# Patient Record
Sex: FEMALE | Race: OTHER | Employment: FULL TIME | URBAN - METROPOLITAN AREA
[De-identification: names, ages, dates, MRNs, and addresses within clinical notes are randomized per-mention and may not be internally consistent; named-entity substitution may affect disease eponyms.]

---

## 2024-09-24 ENCOUNTER — TRANSCRIBE ORDERS (OUTPATIENT)
Dept: SCHEDULING | Age: 44
End: 2024-09-24

## 2024-09-24 DIAGNOSIS — M89.9 BONE LESION: Primary | ICD-10-CM

## 2024-10-21 ENCOUNTER — APPOINTMENT (OUTPATIENT)
Dept: CT IMAGING | Age: 44
End: 2024-10-21
Payer: COMMERCIAL

## 2024-10-21 ENCOUNTER — HOSPITAL ENCOUNTER (EMERGENCY)
Age: 44
Discharge: HOME | End: 2024-10-21
Attending: EMERGENCY MEDICINE
Payer: COMMERCIAL

## 2024-10-21 ENCOUNTER — APPOINTMENT (OUTPATIENT)
Dept: GENERAL RADIOLOGY | Age: 44
End: 2024-10-21
Payer: COMMERCIAL

## 2024-10-21 ENCOUNTER — APPOINTMENT (OUTPATIENT)
Dept: ULTRASOUND IMAGING | Age: 44
End: 2024-10-21
Payer: COMMERCIAL

## 2024-10-21 VITALS
WEIGHT: 196 LBS | HEIGHT: 60 IN | OXYGEN SATURATION: 97 % | RESPIRATION RATE: 18 BRPM | BODY MASS INDEX: 38.48 KG/M2 | DIASTOLIC BLOOD PRESSURE: 82 MMHG | TEMPERATURE: 98.3 F | HEART RATE: 97 BPM | SYSTOLIC BLOOD PRESSURE: 133 MMHG

## 2024-10-21 DIAGNOSIS — I26.93 SINGLE SUBSEGMENTAL PULMONARY EMBOLISM WITHOUT ACUTE COR PULMONALE (HCC): Primary | ICD-10-CM

## 2024-10-21 DIAGNOSIS — M79.661 PAIN IN BOTH LOWER LEGS: ICD-10-CM

## 2024-10-21 DIAGNOSIS — M79.662 PAIN IN BOTH LOWER LEGS: ICD-10-CM

## 2024-10-21 DIAGNOSIS — R91.1 INCIDENTAL PULMONARY NODULE: ICD-10-CM

## 2024-10-21 LAB
ALBUMIN SERPL-MCNC: 3.1 G/DL (ref 3.5–5)
ALBUMIN/GLOB SERPL: 0.6 (ref 1–1.9)
ALP SERPL-CCNC: 93 U/L (ref 35–104)
ALT SERPL-CCNC: 40 U/L (ref 8–45)
ANION GAP SERPL CALC-SCNC: 13 MMOL/L (ref 9–18)
AST SERPL-CCNC: 20 U/L (ref 15–37)
BASOPHILS # BLD: 0.1 K/UL (ref 0–0.2)
BASOPHILS NFR BLD: 1 % (ref 0–2)
BILIRUB SERPL-MCNC: 0.4 MG/DL (ref 0–1.2)
BUN SERPL-MCNC: 12 MG/DL (ref 6–23)
CALCIUM SERPL-MCNC: 10.1 MG/DL (ref 8.8–10.2)
CHLORIDE SERPL-SCNC: 98 MMOL/L (ref 98–107)
CK SERPL-CCNC: 24 U/L (ref 21–215)
CO2 SERPL-SCNC: 28 MMOL/L (ref 20–28)
CREAT SERPL-MCNC: 0.69 MG/DL (ref 0.6–1.1)
CRP SERPL HS-MCNC: 145 MG/L (ref 0–3)
DIFFERENTIAL METHOD BLD: ABNORMAL
EKG ATRIAL RATE: 118 BPM
EKG DIAGNOSIS: NORMAL
EKG P AXIS: 65 DEGREES
EKG P-R INTERVAL: 118 MS
EKG Q-T INTERVAL: 304 MS
EKG QRS DURATION: 68 MS
EKG QTC CALCULATION (BAZETT): 426 MS
EKG R AXIS: 72 DEGREES
EKG T AXIS: 61 DEGREES
EKG VENTRICULAR RATE: 118 BPM
EOSINOPHIL # BLD: 0.1 K/UL (ref 0–0.8)
EOSINOPHIL NFR BLD: 1 % (ref 0.5–7.8)
ERYTHROCYTE [DISTWIDTH] IN BLOOD BY AUTOMATED COUNT: 12.6 % (ref 11.9–14.6)
ERYTHROCYTE [SEDIMENTATION RATE] IN BLOOD: 70 MM/HR (ref 0–20)
GLOBULIN SER CALC-MCNC: 5.3 G/DL (ref 2.3–3.5)
GLUCOSE SERPL-MCNC: 139 MG/DL (ref 70–99)
HCT VFR BLD AUTO: 39.1 % (ref 35.8–46.3)
HGB BLD-MCNC: 12.6 G/DL (ref 11.7–15.4)
IMM GRANULOCYTES # BLD AUTO: 0.1 K/UL (ref 0–0.5)
IMM GRANULOCYTES NFR BLD AUTO: 1 % (ref 0–5)
LIPASE SERPL-CCNC: 21 U/L (ref 13–60)
LYMPHOCYTES # BLD: 1.3 K/UL (ref 0.5–4.6)
LYMPHOCYTES NFR BLD: 13 % (ref 13–44)
MAGNESIUM SERPL-MCNC: 1.9 MG/DL (ref 1.8–2.4)
MCH RBC QN AUTO: 27.5 PG (ref 26.1–32.9)
MCHC RBC AUTO-ENTMCNC: 32.2 G/DL (ref 31.4–35)
MCV RBC AUTO: 85.4 FL (ref 82–102)
MONOCYTES # BLD: 0.5 K/UL (ref 0.1–1.3)
MONOCYTES NFR BLD: 5 % (ref 4–12)
NEUTS SEG # BLD: 7.8 K/UL (ref 1.7–8.2)
NEUTS SEG NFR BLD: 79 % (ref 43–78)
NRBC # BLD: 0 K/UL (ref 0–0.2)
PLATELET # BLD AUTO: 365 K/UL (ref 150–450)
PMV BLD AUTO: 12.7 FL (ref 9.4–12.3)
POTASSIUM SERPL-SCNC: 3.9 MMOL/L (ref 3.5–5.1)
PROT SERPL-MCNC: 8.4 G/DL (ref 6.3–8.2)
RBC # BLD AUTO: 4.58 M/UL (ref 4.05–5.2)
SODIUM SERPL-SCNC: 138 MMOL/L (ref 136–145)
VIT B12 SERPL-MCNC: 615 PG/ML (ref 193–986)
WBC # BLD AUTO: 9.8 K/UL (ref 4.3–11.1)

## 2024-10-21 PROCEDURE — 99285 EMERGENCY DEPT VISIT HI MDM: CPT

## 2024-10-21 PROCEDURE — 86038 ANTINUCLEAR ANTIBODIES: CPT

## 2024-10-21 PROCEDURE — 6360000004 HC RX CONTRAST MEDICATION: Performed by: EMERGENCY MEDICINE

## 2024-10-21 PROCEDURE — 71260 CT THORAX DX C+: CPT | Performed by: RADIOLOGY

## 2024-10-21 PROCEDURE — 93970 EXTREMITY STUDY: CPT

## 2024-10-21 PROCEDURE — 36415 COLL VENOUS BLD VENIPUNCTURE: CPT

## 2024-10-21 PROCEDURE — 93970 EXTREMITY STUDY: CPT | Performed by: RADIOLOGY

## 2024-10-21 PROCEDURE — 83690 ASSAY OF LIPASE: CPT

## 2024-10-21 PROCEDURE — 93010 ELECTROCARDIOGRAM REPORT: CPT | Performed by: INTERNAL MEDICINE

## 2024-10-21 PROCEDURE — 85025 COMPLETE CBC W/AUTO DIFF WBC: CPT

## 2024-10-21 PROCEDURE — 71260 CT THORAX DX C+: CPT

## 2024-10-21 PROCEDURE — 80053 COMPREHEN METABOLIC PANEL: CPT

## 2024-10-21 PROCEDURE — 82607 VITAMIN B-12: CPT

## 2024-10-21 PROCEDURE — 86147 CARDIOLIPIN ANTIBODY EA IG: CPT

## 2024-10-21 PROCEDURE — 72100 X-RAY EXAM L-S SPINE 2/3 VWS: CPT

## 2024-10-21 PROCEDURE — 96374 THER/PROPH/DIAG INJ IV PUSH: CPT

## 2024-10-21 PROCEDURE — 83735 ASSAY OF MAGNESIUM: CPT

## 2024-10-21 PROCEDURE — 6360000002 HC RX W HCPCS: Performed by: EMERGENCY MEDICINE

## 2024-10-21 PROCEDURE — 83520 IMMUNOASSAY QUANT NOS NONAB: CPT

## 2024-10-21 PROCEDURE — 86141 C-REACTIVE PROTEIN HS: CPT

## 2024-10-21 PROCEDURE — 86148 ANTI-PHOSPHOLIPID ANTIBODY: CPT

## 2024-10-21 PROCEDURE — 85652 RBC SED RATE AUTOMATED: CPT

## 2024-10-21 PROCEDURE — 82550 ASSAY OF CK (CPK): CPT

## 2024-10-21 PROCEDURE — 93005 ELECTROCARDIOGRAM TRACING: CPT | Performed by: EMERGENCY MEDICINE

## 2024-10-21 RX ORDER — PREDNISONE 20 MG/1
20 TABLET ORAL 2 TIMES DAILY
Qty: 10 TABLET | Refills: 0 | Status: SHIPPED | OUTPATIENT
Start: 2024-10-21 | End: 2024-10-26

## 2024-10-21 RX ORDER — IOPAMIDOL 755 MG/ML
100 INJECTION, SOLUTION INTRAVASCULAR
Status: COMPLETED | OUTPATIENT
Start: 2024-10-21 | End: 2024-10-21

## 2024-10-21 RX ORDER — PREGABALIN 50 MG/1
50 CAPSULE ORAL 2 TIMES DAILY
Qty: 60 CAPSULE | Refills: 0 | Status: SHIPPED | OUTPATIENT
Start: 2024-10-21 | End: 2024-11-20

## 2024-10-21 RX ORDER — MORPHINE SULFATE 4 MG/ML
4 INJECTION INTRAVENOUS ONCE
Status: COMPLETED | OUTPATIENT
Start: 2024-10-21 | End: 2024-10-21

## 2024-10-21 RX ADMIN — IOPAMIDOL 75 ML: 755 INJECTION, SOLUTION INTRAVENOUS at 11:16

## 2024-10-21 RX ADMIN — MORPHINE SULFATE 4 MG: 4 INJECTION INTRAVENOUS at 12:00

## 2024-10-21 ASSESSMENT — PAIN DESCRIPTION - ORIENTATION
ORIENTATION: LEFT;RIGHT
ORIENTATION: RIGHT;LEFT

## 2024-10-21 ASSESSMENT — PAIN SCALES - GENERAL
PAINLEVEL_OUTOF10: 7
PAINLEVEL_OUTOF10: 10
PAINLEVEL_OUTOF10: 10

## 2024-10-21 ASSESSMENT — PAIN DESCRIPTION - LOCATION: LOCATION: LEG

## 2024-10-21 ASSESSMENT — PAIN - FUNCTIONAL ASSESSMENT: PAIN_FUNCTIONAL_ASSESSMENT: 0-10

## 2024-10-21 NOTE — ED TRIAGE NOTES
Patient arrives to ED pov from home. Patient reports SOB. Patient was diagnosed with a PE on 10/09/204. Patient was placed on Eliquis. Patient reports bilateral leg pain. Denies swelling or redness to legs. Patient reports her legs feel \"warm\".

## 2024-10-21 NOTE — DISCHARGE INSTRUCTIONS
1) your CT scan shows a an incidental finding of a pulmonary nodule.  It is important that this be imaged again in 3 months to ensure that it is not changing.    2) the CT scan of your chest shows that your pulmonary embolism is not getting worse and likely improving.  Continue to take your blood thinner as prescribed.    3) the exact cause of the discomfort in your lower extremities is unclear.  However, you do have some elevated inflammatory markers and we are going to start steroids and Lyrica to see if this helps with your pain.  In addition I have placed a referral for follow-up in our neurology clinic.  They should call you to schedule an appointment.    4) I have also ordered labs that will not come back today while in the emergency department.  These included an antiphospholipid antibody and an TALI.  It is very important that you have your primary care doctor follow-up on the results of these tests.    5) return to the emergency department for any significant worsening of your symptoms.

## 2024-10-21 NOTE — ED PROVIDER NOTES
Emergency Department Provider Note       PCP: Isa Du MD   Age: 44 y.o.   Sex: female     DISPOSITION Decision To Discharge 10/21/2024 12:26:58 PM  Condition at Disposition: Data Unavailable       ICD-10-CM    1. Single subsegmental pulmonary embolism without acute cor pulmonale (HCC)  I26.93       2. Pain in both lower legs  M79.661 pregabalin (LYRICA) 50 MG capsule    M79.662 [facility]      3. Incidental pulmonary nodule  R91.1           Medical Decision Making     Patient is noted to be tachycardic in triage.  Given her known history of pulmonary embolism, I will repeat a CT scan to ensure that this does not appear to be worsening.    Her discomfort seems less likely to be related to nerve root impingement, she has no findings concerning for cauda equina, presentation does not appear consistent with Guillain-Barré.  However I do have some question for acute small fiber neuropathy.  I will add on inflammatory markers.  In addition given the symptoms as well as her new diagnosis of pulmonary embolism, I do have concerns for possible autoimmune processes.  I will order an TALI, antiphospholipid antibody, B12, CK, with plans for these labs to be followed up as an outpatient by her primary care physician or by hematology.    12:32 PM EDT  Evaluation the emergency department is reassuring.  CT shows interval improvement of the pulmonary embolism.  Patient's heart rate is now 102, tachycardia has improved.  She appears more comfortable as well.  She has noted to have elevated inflammatory markers.  I do have ongoing concern for possible small fiber neuropathy.  I did discuss the case with neurology.  They recommended starting Lyrica 50 mg twice daily.  I will also start a short course of prednisone to see if this helps with improvement.  In addition I will refer to the [facility] neurology clinic to determine if further evaluation and testing is necessary.    In addition, I have

## 2024-10-21 NOTE — ED NOTES
US called to confirm they have pt.  US confirmed they have pt and will send to  39 when done.     Gretta Harkins, ALLAN  10/21/24 [id number]

## 2024-10-22 LAB
ANA SER QL: NEGATIVE
INTERPRETATION: NORMAL
SPECIMEN STATUS: NORMAL

## 2024-10-29 ENCOUNTER — OFFICE VISIT (OUTPATIENT)
Dept: NEUROLOGY | Age: 44
End: 2024-10-29
Payer: COMMERCIAL

## 2024-10-29 VITALS
HEIGHT: 60 IN | OXYGEN SATURATION: 98 % | SYSTOLIC BLOOD PRESSURE: 123 MMHG | WEIGHT: 196.4 LBS | DIASTOLIC BLOOD PRESSURE: 85 MMHG | BODY MASS INDEX: 38.56 KG/M2 | HEART RATE: 85 BPM

## 2024-10-29 DIAGNOSIS — R53.83 OTHER FATIGUE: ICD-10-CM

## 2024-10-29 DIAGNOSIS — G44.89 OTHER HEADACHE SYNDROME: ICD-10-CM

## 2024-10-29 DIAGNOSIS — M79.10 MYALGIA: Primary | ICD-10-CM

## 2024-10-29 DIAGNOSIS — M79.10 MYALGIA: ICD-10-CM

## 2024-10-29 PROCEDURE — 99204 OFFICE O/P NEW MOD 45 MIN: CPT | Performed by: PHYSICAL THERAPIST

## 2024-10-29 RX ORDER — APIXABAN 5 MG/1
TABLET, FILM COATED ORAL
COMMUNITY
Start: 2024-10-10

## 2024-10-29 RX ORDER — PREDNISONE 10 MG/1
TABLET ORAL
Qty: 70 TABLET | Refills: 0 | Status: SHIPPED | OUTPATIENT
Start: 2024-10-29 | End: 2024-12-24

## 2024-10-29 ASSESSMENT — PATIENT HEALTH QUESTIONNAIRE - PHQ9
1. LITTLE INTEREST OR PLEASURE IN DOING THINGS: NOT AT ALL
SUM OF ALL RESPONSES TO PHQ QUESTIONS 1-9: 0
SUM OF ALL RESPONSES TO PHQ QUESTIONS 1-9: 0
2. FEELING DOWN, DEPRESSED OR HOPELESS: NOT AT ALL
SUM OF ALL RESPONSES TO PHQ9 QUESTIONS 1 & 2: 0
SUM OF ALL RESPONSES TO PHQ QUESTIONS 1-9: 0
SUM OF ALL RESPONSES TO PHQ QUESTIONS 1-9: 0

## 2024-10-29 NOTE — PROGRESS NOTES
No significant stenosis.  No aneurysm.  No evidence of acute abnormality.     Most recent MRI - I was unable to personally review these images, but did review the results as listed.    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  Likely cystic.  Otherwise, unremarkable MRI    Anu was seen today for follow-up.    Diagnoses and all orders for this visit:    Myalgia  -It is somewhat atypical, but could be consistent with polymyalgia rheumatica given the elevations in ESR and CRP.  While there is a significant amount of subjective pain with mobility, this is limited as she is recently had her steroids which stopped most of her pain.  I do not have any indication that there is a neuropathy present at this time, but this may also be being managed by the steroid use.  Low suspicion for any myopathy, CK was normal.  I do believe this is a primary rheumatologic disorder, and she has a rheumatology appointment on 5 November.  In the meantime we will continue to rule out neurologic diseases as well as some overlap between her rheumatologic and neurologic causes.  Will test for Sjogren's antibodies as well as rheumatoid factor.  It is noted TALI was normal.  -I have reinitiated her prednisone taper, which is a relatively short taper considering she has appoint with rheumatologist on 5 November.  Initiate 20 mg daily for 14 days, 15 mg 14 days, 10 mg for 14 days, and then 5 mg 14 days per  -     Rheumatoid Factor; Future  -     MRI LUMBAR SPINE W WO CONTRAST; Future  -     Sjogren's Ab (SS-A, SS-B); Future    Other fatigue  -As above  -     Rheumatoid Factor; Future  -     Sjogren's Ab (SS-A, SS-B); Future    Other headache syndrome  -I am certainly concerned that with her temple tenderness, and jaw pain she is noting that this could be indicative of giant cell arteritis.  Cannot test ESR again as she has recently had steroids and this will likely impact this test.  I would probably proceed with a temporal

## 2024-10-30 LAB
ENA SS-A AB SER-ACNC: <0.2 AI (ref 0–0.9)
ENA SS-B AB SER-ACNC: <0.2 AI (ref 0–0.9)
RHEUMATOID FACT SER QL LA: NEGATIVE

## 2024-11-05 LAB
INTERPRETATION: NORMAL
SPECIMEN STATUS: NORMAL

## 2024-11-05 RX ORDER — METHYLPREDNISOLONE 4 MG/1
TABLET ORAL SEE ADMIN INSTRUCTIONS
COMMUNITY
Start: 2024-09-23 | End: 2024-11-06

## 2024-11-05 RX ORDER — LORATADINE 10 MG/1
10 TABLET ORAL DAILY
COMMUNITY
End: 2024-11-06

## 2024-11-05 RX ORDER — METHOCARBAMOL 500 MG/1
500 TABLET, FILM COATED ORAL 2 TIMES DAILY
COMMUNITY
Start: 2024-09-28 | End: 2024-11-06

## 2024-11-05 RX ORDER — MELOXICAM 7.5 MG/1
TABLET ORAL
COMMUNITY
Start: 2024-08-26 | End: 2024-11-06

## 2024-11-05 RX ORDER — IBUPROFEN 600 MG/1
600 TABLET, FILM COATED ORAL
COMMUNITY
Start: 2024-09-23 | End: 2024-11-06

## 2024-11-05 RX ORDER — FAMOTIDINE 20 MG/1
20 TABLET, FILM COATED ORAL
COMMUNITY
Start: 2024-09-23 | End: 2024-11-06

## 2024-11-05 RX ORDER — TRAMADOL HYDROCHLORIDE 50 MG/1
50 TABLET ORAL NIGHTLY PRN
COMMUNITY
Start: 2024-10-14 | End: 2024-11-06

## 2024-11-05 RX ORDER — HYDROCODONE BITARTRATE AND ACETAMINOPHEN 5; 325 MG/1; MG/1
1 TABLET ORAL
COMMUNITY
Start: 2024-09-23 | End: 2024-11-06

## 2024-11-05 RX ORDER — FLUTICASONE PROPIONATE 50 MCG
1 SPRAY, SUSPENSION (ML) NASAL DAILY
COMMUNITY
Start: 2024-09-23

## 2024-11-05 ASSESSMENT — RHEUMATOLOGY NEW PATIENT QUESTIONNAIRE
HEARTBURN OR REFLUX: N
UNUSUAL BLEEDING: N
ANXIETY: N
HOW WOULD YOU DESCRIBE YOUR STIFFNESS ON AVERAGE: MILD
MUSCLE WEAKNESS: Y
JAUNDICE: N
DIFFICULTY BREATHING LYING DOWN: Y
ANEMIA: N
DIFFICULTY FALLING ASLEEP: N
HEADACHES: Y
UNEXPLAINED HEARING LOSS: N
PERSISTENT DIARRHEA: Y
SORES IN MOUTH OR NOSE: N
RASH OR ULCERS: N
DRYNESS OF MOUTH: N
JOINT PAIN: Y
VOMITING OF BLOOD OR COFFEE GROUND CONSISTENCY MATERIAL: N
UNEXPLAINED WEIGHT CHANGE: Y
PAIN OR BURNING ON URINATION: N
EYE PAIN: Y
SWOLLEN OR TENDER GLANDS: N
AGITATION: N
COLOR CHANGES OF HANDS OR FEET IN THE COLD: N
HOARSE VOICE: N
CHEST PAIN: N
NODULES/BUMPS: N
NIGHT SWEATS: Y
SEIZURES: N
UNUSUALLY RAPID OR SLOWED HEART RATE: Y
DIFFICULTY STAYING ASLEEP: Y
BEHAVIORAL CHANGES: N
ABNORMAL URINE: N
LOSS OF CONSCIOUSNESS: N
FEVER: N
UNUSUAL FATIGUE: Y
DEPRESSION: N
SHORTNESS OF BREATH: Y
BLACK STOOLS: N
BLOOD IN STOOLS: N
LIST JOINTS AFFECTED BY SWELLING IN THE PAST MONTH: KNEES
STOMACH PAIN: N
MEMORY LOSS: N
FAINTING: N
VAGINAL DRYNESS: N
NUMBNESS OR TINGLING IN HANDS OR FEET: Y
EASILY LOSING TEMPER: N
SUN SENSITIVE (SUN ALLERGY): N
EASY BRUISING: Y
COUGH: N
MORNING STIFFNESS: Y
SWOLLEN LEGS OR FEET: Y
RASH: N
MORNING STIFFNESS IN LOWER BACK: N
EXCESSIVE HAIR LOSS (MORE THAN YOUR NORM): N
INCREASED SUSCEPTIBILITY TO INFECTION: N
SKIN REDNESS: N
SKIN TIGHTNESS: N
DIFFICULTY SWALLOWING: N
JOINT SWELLING: Y
NAUSEA: N

## 2024-11-06 ENCOUNTER — OFFICE VISIT (OUTPATIENT)
Dept: RHEUMATOLOGY | Age: 44
End: 2024-11-06

## 2024-11-06 VITALS
DIASTOLIC BLOOD PRESSURE: 72 MMHG | HEART RATE: 82 BPM | WEIGHT: 194 LBS | SYSTOLIC BLOOD PRESSURE: 123 MMHG | HEIGHT: 60 IN | BODY MASS INDEX: 38.09 KG/M2

## 2024-11-06 DIAGNOSIS — R70.0 ESR RAISED: ICD-10-CM

## 2024-11-06 DIAGNOSIS — E03.8 OTHER SPECIFIED HYPOTHYROIDISM: ICD-10-CM

## 2024-11-06 DIAGNOSIS — R53.83 OTHER FATIGUE: ICD-10-CM

## 2024-11-06 DIAGNOSIS — M25.40 JOINT SWELLING: ICD-10-CM

## 2024-11-06 DIAGNOSIS — M06.4 INFLAMMATORY POLYARTHRITIS (HCC): ICD-10-CM

## 2024-11-06 DIAGNOSIS — R79.82 CRP ELEVATED: ICD-10-CM

## 2024-11-06 DIAGNOSIS — M79.10 MYALGIA: ICD-10-CM

## 2024-11-06 DIAGNOSIS — R89.4 SEROLOGIC ABNORMALITY: ICD-10-CM

## 2024-11-06 DIAGNOSIS — I82.409 ACUTE DEEP VEIN THROMBOSIS (DVT) OF OTHER VEIN OF LOWER EXTREMITY, UNSPECIFIED LATERALITY (HCC): ICD-10-CM

## 2024-11-06 DIAGNOSIS — Z71.85 VACCINE COUNSELING: ICD-10-CM

## 2024-11-06 DIAGNOSIS — Z79.52 LONG TERM (CURRENT) USE OF SYSTEMIC STEROIDS: ICD-10-CM

## 2024-11-06 DIAGNOSIS — M06.4 INFLAMMATORY POLYARTHRITIS (HCC): Primary | ICD-10-CM

## 2024-11-06 DIAGNOSIS — Z79.899 LONG TERM CURRENT USE OF IMMUNOSUPPRESSIVE DRUG: ICD-10-CM

## 2024-11-06 DIAGNOSIS — G62.9 NEUROPATHY: ICD-10-CM

## 2024-11-06 DIAGNOSIS — E55.9 HYPOVITAMINOSIS D: ICD-10-CM

## 2024-11-06 LAB
25(OH)D3 SERPL-MCNC: 22.9 NG/ML (ref 30–100)
CK SERPL-CCNC: 23 U/L (ref 21–215)
CRP SERPL-MCNC: 1 MG/DL (ref 0–0.4)
ERYTHROCYTE [SEDIMENTATION RATE] IN BLOOD: 23 MM/HR (ref 0–20)
HAV IGM SER QL: NONREACTIVE
HBV CORE IGM SER QL: NONREACTIVE
HBV SURFACE AG SER QL: NONREACTIVE
HCV AB SER QL: NONREACTIVE

## 2024-11-06 RX ORDER — PREGABALIN 50 MG/1
50 CAPSULE ORAL 2 TIMES DAILY
COMMUNITY

## 2024-11-06 RX ORDER — PREDNISONE 10 MG/1
15 TABLET ORAL DAILY
COMMUNITY

## 2024-11-06 NOTE — PROGRESS NOTES
Patient is seen as a new consult at the request of Dr. Fontaine          Subjective:      HPI:        Permanent history    Mrs. Thompson is a very pleasant 44-year-old  lady with past medical history of DVT on long-term anticoagulation who presents for evaluation of possible inflammatory polyarthritis.    Patient reports - 6mths ago she was fine, end of Aug 2024, hurt knee, turned wrong way, heard pop  ,took advil ice, went to Urgent Care, took xrays, no fx, given mobic, sent to Ortho, given steroid shot, helped for 4 days, went back to Ortho, had Mri, results were good for knee but symptoms persisted then had MRI femur, went to PCP for new onset/worsening headaches and knee pain.  She reports headache started after she was given tramadol one of the ER visits.  After this over the next 2 months she went to ER 3 more times, numbness on lt side of scalp,pcp gave her a tylenol cocktail -the headaches as mentioned got worse after taking tramadol but even after she stopped the tramadol headaches contined.  Again leg pain continued, went to the ER again for slowly leg pain worsened to involve both legs and whole legs.  Almost looked like she was trying to l earn to walk, so then she seen another time at [place] ER where she was, given prednisone and lyrica anf d that was the 1st night she was able to sleep, next morning she could walk again. Currently taking Pred 10mg bid.  said she had PMR and GCA. The headaches have subsided, head still feels weird, throbbing over temples.  During all of this patient was noted to have DVT, for which she was started on anticoagulation and recently followed up outpatient with hematology.  Has also been seen by neurology both ER and outpatient.  No definitive diagnosis.    Has clearly had dramatic response to prednisone and every time she tapers down symptoms slowly start worsening again.    Denies any family history of rheumatoid arthritis, lupus, Sjogren's, has 2 children

## 2024-11-06 NOTE — PATIENT INSTRUCTIONS
Labs - CCP, hep panel, vitamin D, CRP, sed rate, lupus panel, ANCA's, C3, C4, anti-GBM antibodies, CK, anti-smooth muscle antibody, antimitochondrial antibody, TB QuantiFERON  Continue prednisone 20 mg daily for now  Continue Lyrica 50 mg twice daily  Return to clinic in a week call if any issues  Records including CBC and CMP from Dr. Du's office from a year ago    FR- 1WK,Labs BS NOW,PCP Notes

## 2024-11-07 LAB
C-ANCA TITR SER IF: NORMAL TITER
CCP IGA+IGG SERPL IA-ACNC: 6 UNITS (ref 0–19)
CENTROMERE B AB SER-ACNC: <0.2 AI (ref 0–0.9)
CHROMATIN AB SERPL-ACNC: <0.2 AI (ref 0–0.9)
DSDNA AB SER-ACNC: <1 IU/ML (ref 0–9)
ENA JO1 AB SER-ACNC: <0.2 AI (ref 0–0.9)
ENA RNP AB SER-ACNC: <0.2 AI (ref 0–0.9)
ENA SCL70 AB SER-ACNC: <0.2 AI (ref 0–0.9)
ENA SM AB SER-ACNC: <0.2 AI (ref 0–0.9)
ENA SS-A AB SER-ACNC: <0.2 AI (ref 0–0.9)
ENA SS-B AB SER-ACNC: <0.2 AI (ref 0–0.9)
Lab: NORMAL
MYELOPEROXIDASE AB SER IA-ACNC: <0.2 UNITS (ref 0–0.9)
P-ANCA ATYPICAL TITR SER IF: NORMAL TITER
P-ANCA TITR SER IF: NORMAL TITER
PROTEINASE3 AB SER IA-ACNC: <0.2 UNITS (ref 0–0.9)
RHEUMATOID FACT SER QL LA: NEGATIVE

## 2024-11-08 LAB
C3 SERPL-MCNC: 145 MG/DL (ref 82–167)
C4 SERPL-MCNC: 32 MG/DL (ref 12–38)
GBM IGG SER-ACNC: <0.2 UNITS (ref 0–0.9)
MITOCHONDRIA M2 IGG SER-ACNC: <20 UNITS (ref 0–20)
SMA IGG SER-ACNC: 11 UNITS (ref 0–19)

## 2024-11-11 LAB
M TB IFN-G BLD-IMP: ABNORMAL
M TB IFN-G CD4+ T-CELLS BLD-ACNC: 0.1 IU/ML
M TBIFN-G CD4+ CD8+T-CELLS BLD-ACNC: 0.52 IU/ML
QUANTIFERON CRITERIA: ABNORMAL
QUANTIFERON MITOGEN VALUE: 3.96 IU/ML
QUANTIFERON NIL VALUE: 9.82 IU/ML

## 2024-11-13 ENCOUNTER — OFFICE VISIT (OUTPATIENT)
Dept: RHEUMATOLOGY | Age: 44
End: 2024-11-13

## 2024-11-13 VITALS
SYSTOLIC BLOOD PRESSURE: 128 MMHG | BODY MASS INDEX: 38.28 KG/M2 | HEART RATE: 80 BPM | DIASTOLIC BLOOD PRESSURE: 85 MMHG | WEIGHT: 196 LBS

## 2024-11-13 DIAGNOSIS — Z23 NEED FOR INFLUENZA VACCINATION: ICD-10-CM

## 2024-11-13 DIAGNOSIS — M79.10 MYALGIA: ICD-10-CM

## 2024-11-13 DIAGNOSIS — Z71.85 VACCINE COUNSELING: ICD-10-CM

## 2024-11-13 DIAGNOSIS — E03.8 OTHER SPECIFIED HYPOTHYROIDISM: ICD-10-CM

## 2024-11-13 DIAGNOSIS — M06.4 INFLAMMATORY POLYARTHRITIS (HCC): ICD-10-CM

## 2024-11-13 DIAGNOSIS — R79.82 CRP ELEVATED: ICD-10-CM

## 2024-11-13 DIAGNOSIS — R70.0 ESR RAISED: ICD-10-CM

## 2024-11-13 DIAGNOSIS — Z23 ENCOUNTER FOR IMMUNIZATION: ICD-10-CM

## 2024-11-13 DIAGNOSIS — R53.83 OTHER FATIGUE: ICD-10-CM

## 2024-11-13 DIAGNOSIS — R89.4 SEROLOGIC ABNORMALITY: ICD-10-CM

## 2024-11-13 DIAGNOSIS — M06.4 INFLAMMATORY POLYARTHRITIS (HCC): Primary | ICD-10-CM

## 2024-11-13 NOTE — PATIENT INSTRUCTIONS
Labs -at next visit repeat QuantiFERON, APLS antibody panel, parvovirus  Continue prednisone but decrease to 15 mg x 5 days then 10 mg x 5 days then 5 mg daily until follow-up mg daily for now  Records including CBC and CMP from Dr. Du's office from a year ago  Reading material on inflammatory polyarthritis on methotrexate  High-dose flu shot today  Return to clinic in 2 weeks call if any issues    2WK,Labs BS Now,PCP Notes

## 2024-11-14 LAB
APTT SCREEN TO CONFIRM RATIO: 1.08 RATIO (ref 0–1.34)
CONFIRM APTT/NORMAL: 41.4 SEC (ref 0–47.6)
DRVVT RATIO: 1 RATIO (ref 0.8–1.2)
LA 2 SCREEN W REFLEX-IMP: ABNORMAL
MIXING DRVVT: 41.2 SEC (ref 0–40.4)
SCREEN APTT: 43.2 SEC (ref 0–43.5)
SCREEN DRVVT: 48.1 SEC (ref 0–47)
THROMBIN TIME: 18.5 SEC (ref 0–23)

## 2024-11-15 LAB
B19V IGG SER IA-ACNC: 0.2 INDEX (ref 0–0.8)
B19V IGM SER IA-ACNC: 0.1 INDEX (ref 0–0.8)
B2 GLYCOPROT1 IGG SER-ACNC: <9 GPI IGG UNITS (ref 0–20)
B2 GLYCOPROT1 IGM SER-ACNC: 13 GPI IGM UNITS (ref 0–32)

## 2024-11-18 LAB
M TB IFN-G BLD-IMP: NEGATIVE
M TB IFN-G CD4+ T-CELLS BLD-ACNC: 0.02 IU/ML
M TBIFN-G CD4+ CD8+T-CELLS BLD-ACNC: 0.1 IU/ML
QUANTIFERON CRITERIA: NORMAL
QUANTIFERON MITOGEN VALUE: 4.76 IU/ML
QUANTIFERON NIL VALUE: 0.01 IU/ML

## 2024-11-24 RX ORDER — PREDNISONE 5 MG/1
TABLET ORAL
Qty: 100 TABLET | Refills: 0 | Status: SHIPPED | OUTPATIENT
Start: 2024-11-24

## 2024-11-26 NOTE — PROGRESS NOTES
Subjective:      HPI:        Permanent history    Mrs. Thompson is a very pleasant 44-year-old  lady with past medical history of DVT on long-term anticoagulation who presents for evaluation of possible inflammatory polyarthritis.    Patient reports - 6mths ago she was fine, end of Aug 2024, hurt knee, turned wrong way, heard pop  ,took advil ice, went to Urgent Care, took xrays, no fx, given mobic, sent to Ortho, given steroid shot, helped for 4 days, went back to Ortho, had Mri, results were good for knee but symptoms persisted then had MRI femur, went to PCP for new onset/worsening headaches and knee pain.  She reports headache started after she was given tramadol one of the ER visits.  After this over the next 2 months she went to ER 3 more times, numbness on lt side of scalp,pcp gave her a tylenol cocktail -the headaches as mentioned got worse after taking tramadol but even after she stopped the tramadol headaches contined.  Again leg pain continued, went to the ER again for slowly leg pain worsened to involve both legs and whole legs.  Almost looked like she was trying to l earn to walk, so then she seen another time at [place] ER where she was, given prednisone and lyrica anf d that was the 1st night she was able to sleep, next morning she could walk again. Currently taking Pred 10mg bid.  said she had PMR and GCA. The headaches have subsided, head still feels weird, throbbing over temples.  During all of this patient was noted to have DVT, for which she was started on anticoagulation and recently followed up outpatient with hematology.  Has also been seen by neurology both ER and outpatient.  No definitive diagnosis.    Has clearly had dramatic response to prednisone and every time she tapers down symptoms slowly start worsening again.    Denies any family history of rheumatoid arthritis, lupus, Sjogren's, has 2 children who are otherwise healthy no significant history is either in herself

## 2024-11-27 ENCOUNTER — OFFICE VISIT (OUTPATIENT)
Dept: RHEUMATOLOGY | Age: 44
End: 2024-11-27
Payer: COMMERCIAL

## 2024-11-27 VITALS
HEART RATE: 86 BPM | BODY MASS INDEX: 39.27 KG/M2 | DIASTOLIC BLOOD PRESSURE: 86 MMHG | HEIGHT: 60 IN | SYSTOLIC BLOOD PRESSURE: 135 MMHG | WEIGHT: 200 LBS

## 2024-11-27 DIAGNOSIS — I77.6 VASCULITIS (HCC): ICD-10-CM

## 2024-11-27 DIAGNOSIS — Z79.899 HIGH RISK MEDICATION USE: ICD-10-CM

## 2024-11-27 DIAGNOSIS — E55.9 HYPOVITAMINOSIS D: ICD-10-CM

## 2024-11-27 DIAGNOSIS — Z79.52 LONG TERM (CURRENT) USE OF SYSTEMIC STEROIDS: ICD-10-CM

## 2024-11-27 DIAGNOSIS — R89.4 SEROLOGIC ABNORMALITY: ICD-10-CM

## 2024-11-27 DIAGNOSIS — Z79.899 LONG TERM CURRENT USE OF IMMUNOSUPPRESSIVE DRUG: ICD-10-CM

## 2024-11-27 DIAGNOSIS — M25.40 JOINT SWELLING: ICD-10-CM

## 2024-11-27 DIAGNOSIS — I82.409 ACUTE DEEP VEIN THROMBOSIS (DVT) OF OTHER VEIN OF LOWER EXTREMITY, UNSPECIFIED LATERALITY (HCC): ICD-10-CM

## 2024-11-27 DIAGNOSIS — R53.83 OTHER FATIGUE: ICD-10-CM

## 2024-11-27 DIAGNOSIS — M06.4 INFLAMMATORY POLYARTHRITIS (HCC): Primary | ICD-10-CM

## 2024-11-27 DIAGNOSIS — R70.0 ESR RAISED: ICD-10-CM

## 2024-11-27 DIAGNOSIS — G62.9 NEUROPATHY: ICD-10-CM

## 2024-11-27 DIAGNOSIS — M79.10 MYALGIA: ICD-10-CM

## 2024-11-27 LAB
APPEARANCE UR: CLEAR
BACTERIA URNS QL MICRO: ABNORMAL /HPF
BILIRUB UR QL: NEGATIVE
CASTS URNS QL MICRO: 0 /LPF
COLOR UR: ABNORMAL
CRYSTALS URNS QL MICRO: 0 /LPF
EPI CELLS #/AREA URNS HPF: ABNORMAL /HPF
ERYTHROCYTE [SEDIMENTATION RATE] IN BLOOD: 16 MM/HR (ref 0–20)
GLUCOSE UR STRIP.AUTO-MCNC: NEGATIVE MG/DL
HGB UR QL STRIP: NEGATIVE
KETONES UR QL STRIP.AUTO: NEGATIVE MG/DL
LEUKOCYTE ESTERASE UR QL STRIP.AUTO: ABNORMAL
MUCOUS THREADS URNS QL MICRO: 0 /LPF
NITRITE UR QL STRIP.AUTO: NEGATIVE
OTHER OBSERVATIONS: ABNORMAL
PH UR STRIP: 5 (ref 5–9)
PROT UR STRIP-MCNC: NEGATIVE MG/DL
RBC #/AREA URNS HPF: ABNORMAL /HPF
SP GR UR REFRACTOMETRY: 1.02 (ref 1–1.02)
URINE CULTURE IF INDICATED: ABNORMAL
UROBILINOGEN UR QL STRIP.AUTO: 0.2 EU/DL (ref 0.2–1)
WBC URNS QL MICRO: ABNORMAL /HPF

## 2024-11-27 PROCEDURE — 99215 OFFICE O/P EST HI 40 MIN: CPT | Performed by: INTERNAL MEDICINE

## 2024-11-27 NOTE — PATIENT INSTRUCTIONS
Labs -today urinalysis, urine culture if needed, ESR, sed rate, ANCA's, anti-GBM antibodies   Increase prednisone to 20 mg daily for 5 days then 15 mg x 5 days then 10 mg x 5 days then 5 mg daily until follow-up mg daily for now  Start methotrexate 4 pills which is 10 mg altogether once weekly with daily folic acid 1 mg  Return to clinic in 4 weeks with CBC and CMP call if any issues    4WK,Labs BS NOW & PRIOR

## 2024-11-29 RX ORDER — METHOTREXATE 2.5 MG/1
TABLET ORAL
Qty: 48 TABLET | Refills: 0 | OUTPATIENT
Start: 2024-11-29

## 2024-11-29 RX ORDER — FOLIC ACID 1 MG/1
1 TABLET ORAL DAILY
Qty: 90 TABLET | Refills: 0 | OUTPATIENT
Start: 2024-11-29

## 2024-11-29 RX ORDER — PREDNISONE 5 MG/1
TABLET ORAL
Qty: 100 TABLET | Refills: 0 | OUTPATIENT
Start: 2024-11-29

## 2024-12-02 LAB
C-ANCA TITR SER IF: NORMAL TITER
GBM IGG SER-ACNC: <0.2 UNITS (ref 0–0.9)
MYELOPEROXIDASE AB SER IA-ACNC: <0.2 UNITS (ref 0–0.9)
P-ANCA ATYPICAL TITR SER IF: NORMAL TITER
P-ANCA TITR SER IF: NORMAL TITER
PROTEINASE3 AB SER IA-ACNC: <0.2 UNITS (ref 0–0.9)

## 2024-12-17 NOTE — PROGRESS NOTES
Subjective:      HPI:        Permanent history    Mrs. Thompson is a very pleasant 44-year-old  lady with past medical history of DVT on long-term anticoagulation who presents for evaluation of possible inflammatory polyarthritis.    Patient reports - 6mths ago she was fine, end of Aug 2024, hurt knee, turned wrong way, heard pop  ,took advil ice, went to Urgent Care, took xrays, no fx, given mobic, sent to Ortho, given steroid shot, helped for 4 days, went back to Ortho, had Mri, results were good for knee but symptoms persisted then had MRI femur, went to PCP for new onset/worsening headaches and knee pain.  She reports headache started after she was given tramadol one of the ER visits.  After this over the next 2 months she went to ER 3 more times, numbness on lt side of scalp,pcp gave her a tylenol cocktail -the headaches as mentioned got worse after taking tramadol but even after she stopped the tramadol headaches contined.  Again leg pain continued, went to the ER again for slowly leg pain worsened to involve both legs and whole legs.  Almost looked like she was trying to l earn to walk, so then she seen another time at [place] ER where she was, given prednisone and lyrica anf d that was the 1st night she was able to sleep, next morning she could walk again. Currently taking Pred 10mg bid.  said she had PMR and GCA. The headaches have subsided, head still feels weird, throbbing over temples.  During all of this patient was noted to have DVT, for which she was started on anticoagulation and recently followed up outpatient with hematology.  Has also been seen by neurology both ER and outpatient.  No definitive diagnosis.    Has clearly had dramatic response to prednisone and every time she tapers down symptoms slowly start worsening again.    Denies any family history of rheumatoid arthritis, lupus, Sjogren's, has 2 children who are otherwise healthy no significant history is either in herself or

## 2024-12-18 ENCOUNTER — OFFICE VISIT (OUTPATIENT)
Dept: RHEUMATOLOGY | Age: 44
End: 2024-12-18
Payer: COMMERCIAL

## 2024-12-18 VITALS
DIASTOLIC BLOOD PRESSURE: 80 MMHG | SYSTOLIC BLOOD PRESSURE: 123 MMHG | BODY MASS INDEX: 40.64 KG/M2 | WEIGHT: 207 LBS | HEART RATE: 83 BPM | HEIGHT: 60 IN

## 2024-12-18 DIAGNOSIS — B37.31 VAGINAL CANDIDIASIS: ICD-10-CM

## 2024-12-18 DIAGNOSIS — R89.4 SEROLOGIC ABNORMALITY: ICD-10-CM

## 2024-12-18 DIAGNOSIS — G62.9 NEUROPATHY: ICD-10-CM

## 2024-12-18 DIAGNOSIS — M25.40 JOINT SWELLING: ICD-10-CM

## 2024-12-18 DIAGNOSIS — M06.4 INFLAMMATORY POLYARTHRITIS (HCC): Primary | ICD-10-CM

## 2024-12-18 DIAGNOSIS — Z79.52 LONG TERM (CURRENT) USE OF SYSTEMIC STEROIDS: ICD-10-CM

## 2024-12-18 DIAGNOSIS — M79.10 MYALGIA: ICD-10-CM

## 2024-12-18 DIAGNOSIS — Z79.899 HIGH RISK MEDICATION USE: ICD-10-CM

## 2024-12-18 DIAGNOSIS — Z79.899 LONG TERM CURRENT USE OF IMMUNOSUPPRESSIVE DRUG: ICD-10-CM

## 2024-12-18 DIAGNOSIS — R53.83 OTHER FATIGUE: ICD-10-CM

## 2024-12-18 DIAGNOSIS — E55.9 HYPOVITAMINOSIS D: ICD-10-CM

## 2024-12-18 DIAGNOSIS — I82.409 ACUTE DEEP VEIN THROMBOSIS (DVT) OF OTHER VEIN OF LOWER EXTREMITY, UNSPECIFIED LATERALITY (HCC): ICD-10-CM

## 2024-12-18 PROCEDURE — 99215 OFFICE O/P EST HI 40 MIN: CPT | Performed by: INTERNAL MEDICINE

## 2024-12-18 RX ORDER — ERGOCALCIFEROL 1.25 MG/1
50000 CAPSULE, LIQUID FILLED ORAL WEEKLY
Qty: 12 CAPSULE | Refills: 0 | Status: SHIPPED | OUTPATIENT
Start: 2024-12-18

## 2024-12-18 RX ORDER — GABAPENTIN 300 MG/1
CAPSULE ORAL
Qty: 90 CAPSULE | Refills: 0 | Status: SHIPPED | OUTPATIENT
Start: 2024-12-18 | End: 2025-12-23

## 2024-12-18 RX ORDER — FOLIC ACID 1 MG/1
1 TABLET ORAL DAILY
Qty: 90 TABLET | Refills: 0 | Status: SHIPPED | OUTPATIENT
Start: 2024-12-18

## 2024-12-18 RX ORDER — METHOTREXATE 2.5 MG/1
TABLET ORAL
Qty: 72 TABLET | Refills: 0 | Status: SHIPPED | OUTPATIENT
Start: 2024-12-18

## 2024-12-18 RX ORDER — FLUCONAZOLE 150 MG/1
TABLET ORAL
Qty: 3 TABLET | Refills: 0 | Status: SHIPPED | OUTPATIENT
Start: 2024-12-18

## 2024-12-18 NOTE — PATIENT INSTRUCTIONS
Labs -CBC, CMP, vitamin D  Prednisone starting tomorrow 2.5 mg once daily x 5 days then stop  Continue methotrexate but increase to 6 pills which is 15 mg altogether once weekly with daily folic acid 1 mg  Stop fluconazole 150 mg daily x 3 days  Start gabapentin 300 mg once daily at 7 PM as needed  Vitamin D 50,000 units once weekly for now  Return to clinic in 5-6 weeks with CBC and CMP call if any issues

## 2025-01-09 ENCOUNTER — TELEPHONE (OUTPATIENT)
Dept: NEUROLOGY | Age: 45
End: 2025-01-09

## 2025-01-24 DIAGNOSIS — Z79.899 LONG TERM CURRENT USE OF IMMUNOSUPPRESSIVE DRUG: ICD-10-CM

## 2025-01-24 DIAGNOSIS — Z79.52 LONG TERM (CURRENT) USE OF SYSTEMIC STEROIDS: ICD-10-CM

## 2025-01-24 DIAGNOSIS — M79.10 MYALGIA: ICD-10-CM

## 2025-01-24 DIAGNOSIS — R53.83 OTHER FATIGUE: ICD-10-CM

## 2025-01-24 DIAGNOSIS — E55.9 HYPOVITAMINOSIS D: ICD-10-CM

## 2025-01-24 DIAGNOSIS — M06.4 INFLAMMATORY POLYARTHRITIS (HCC): ICD-10-CM

## 2025-01-24 DIAGNOSIS — I82.409 ACUTE DEEP VEIN THROMBOSIS (DVT) OF OTHER VEIN OF LOWER EXTREMITY, UNSPECIFIED LATERALITY (HCC): ICD-10-CM

## 2025-01-24 DIAGNOSIS — R89.4 SEROLOGIC ABNORMALITY: ICD-10-CM

## 2025-01-24 DIAGNOSIS — G62.9 NEUROPATHY: ICD-10-CM

## 2025-01-24 DIAGNOSIS — M25.40 JOINT SWELLING: ICD-10-CM

## 2025-01-24 DIAGNOSIS — Z79.899 HIGH RISK MEDICATION USE: ICD-10-CM

## 2025-01-24 LAB
25(OH)D3 SERPL-MCNC: 21 NG/ML (ref 30–100)
ALBUMIN SERPL-MCNC: 3.5 G/DL (ref 3.5–5)
ALBUMIN/GLOB SERPL: 1 (ref 1–1.9)
ALP SERPL-CCNC: 165 U/L (ref 35–104)
ALT SERPL-CCNC: 117 U/L (ref 8–45)
ANION GAP SERPL CALC-SCNC: 13 MMOL/L (ref 7–16)
AST SERPL-CCNC: 34 U/L (ref 15–37)
BILIRUB SERPL-MCNC: 0.7 MG/DL (ref 0–1.2)
BUN SERPL-MCNC: 20 MG/DL (ref 6–23)
CALCIUM SERPL-MCNC: 9.9 MG/DL (ref 8.8–10.2)
CHLORIDE SERPL-SCNC: 103 MMOL/L (ref 98–107)
CO2 SERPL-SCNC: 25 MMOL/L (ref 20–29)
CREAT SERPL-MCNC: 0.76 MG/DL (ref 0.6–1.1)
ERYTHROCYTE [DISTWIDTH] IN BLOOD BY AUTOMATED COUNT: 14.6 % (ref 11.9–14.6)
GLOBULIN SER CALC-MCNC: 3.7 G/DL (ref 2.3–3.5)
GLUCOSE SERPL-MCNC: 91 MG/DL (ref 70–99)
HCT VFR BLD AUTO: 41.4 % (ref 35.8–46.3)
HGB BLD-MCNC: 13 G/DL (ref 11.7–15.4)
MCH RBC QN AUTO: 27.8 PG (ref 26.1–32.9)
MCHC RBC AUTO-ENTMCNC: 31.4 G/DL (ref 31.4–35)
MCV RBC AUTO: 88.5 FL (ref 82–102)
NRBC # BLD: 0 K/UL (ref 0–0.2)
PLATELET # BLD AUTO: 273 K/UL (ref 150–450)
PMV BLD AUTO: 13.3 FL (ref 9.4–12.3)
POTASSIUM SERPL-SCNC: 4.4 MMOL/L (ref 3.5–5.1)
PROT SERPL-MCNC: 7.2 G/DL (ref 6.3–8.2)
RBC # BLD AUTO: 4.68 M/UL (ref 4.05–5.2)
SODIUM SERPL-SCNC: 140 MMOL/L (ref 136–145)
WBC # BLD AUTO: 7.5 K/UL (ref 4.3–11.1)

## 2025-01-27 ENCOUNTER — OFFICE VISIT (OUTPATIENT)
Dept: RHEUMATOLOGY | Age: 45
End: 2025-01-27
Payer: COMMERCIAL

## 2025-01-27 VITALS
HEART RATE: 77 BPM | WEIGHT: 205 LBS | BODY MASS INDEX: 40.25 KG/M2 | SYSTOLIC BLOOD PRESSURE: 126 MMHG | HEIGHT: 60 IN | DIASTOLIC BLOOD PRESSURE: 87 MMHG

## 2025-01-27 DIAGNOSIS — R79.82 CRP ELEVATED: ICD-10-CM

## 2025-01-27 DIAGNOSIS — R89.4 SEROLOGIC ABNORMALITY: ICD-10-CM

## 2025-01-27 DIAGNOSIS — E03.8 OTHER SPECIFIED HYPOTHYROIDISM: ICD-10-CM

## 2025-01-27 DIAGNOSIS — Z79.899 LONG TERM CURRENT USE OF IMMUNOSUPPRESSIVE DRUG: ICD-10-CM

## 2025-01-27 DIAGNOSIS — Z79.52 LONG TERM (CURRENT) USE OF SYSTEMIC STEROIDS: ICD-10-CM

## 2025-01-27 DIAGNOSIS — M79.10 MYALGIA: ICD-10-CM

## 2025-01-27 DIAGNOSIS — R70.0 ESR RAISED: ICD-10-CM

## 2025-01-27 DIAGNOSIS — G62.9 NEUROPATHY: ICD-10-CM

## 2025-01-27 DIAGNOSIS — M06.4 INFLAMMATORY POLYARTHRITIS (HCC): Primary | ICD-10-CM

## 2025-01-27 DIAGNOSIS — R53.83 OTHER FATIGUE: ICD-10-CM

## 2025-01-27 DIAGNOSIS — E55.9 HYPOVITAMINOSIS D: ICD-10-CM

## 2025-01-27 DIAGNOSIS — I82.409 ACUTE DEEP VEIN THROMBOSIS (DVT) OF OTHER VEIN OF LOWER EXTREMITY, UNSPECIFIED LATERALITY (HCC): ICD-10-CM

## 2025-01-27 DIAGNOSIS — Z79.899 HIGH RISK MEDICATION USE: ICD-10-CM

## 2025-01-27 PROCEDURE — 99215 OFFICE O/P EST HI 40 MIN: CPT | Performed by: INTERNAL MEDICINE

## 2025-01-27 RX ORDER — PREDNISONE 5 MG/1
TABLET ORAL
Qty: 100 TABLET | Refills: 0 | Status: SHIPPED | OUTPATIENT
Start: 2025-01-27

## 2025-01-27 NOTE — PROGRESS NOTES
Procedure Laterality Date    ABDOMINOPLASTY  2013    BREAST IMPLANT REMOVAL      2022    SALPINGECTOMY Bilateral 12/2022    TONSILLECTOMY         Family History   Problem Relation Age of Onset    Thyroid Disease Mother     Hypertension Mother     Other Mother         Thyroid    Hypothyroidism Mother     Cancer Father         Kidney    Stroke Father     Diabetes Father     Heart Attack Father     Kidney Disease Father     Coronary Art Dis Father     Heart Disease Father     Other Sister         vein in leg issues-surgery before    Thyroid Disease Sister     Hypothyroidism Sister     Arthritis Brother     No Known Problems Child     No Known Problems Child        Social History     Socioeconomic History    Marital status:      Spouse name: None    Number of children: None    Years of education: None    Highest education level: None   Tobacco Use    Smoking status: Former     Types: Cigarettes    Smokeless tobacco: Never    Tobacco comments:     Quit over 10 years ago   Substance and Sexual Activity    Alcohol use: Not Currently     Alcohol/week: 1.0 - 3.0 standard drink of alcohol     Types: 1 - 3 Glasses of wine per week    Drug use: Never    Sexual activity: Yes     Partners: Male     Social Determinants of Health     Social Connections: Unknown (3/20/2021)    Received from [employer]    Social Connections     Frequency of Communication with Friends and Family: Not asked     Frequency of Social Gatherings with Friends and Family: Not asked   Intimate Partner Violence: Unknown (3/20/2021)    Received from [employer]    Intimate Partner Violence     Fear of Current or Ex-Partner: Not asked     Emotionally Abused: Not asked     Physically Abused: Not asked     Sexually Abused: Not asked   Housing Stability: Not At Risk (3/9/2022)    Received from [employer]    Housing Stability     Was there a time when you did not have a steady place to sleep: Not asked     Worried that the place you are staying is

## 2025-01-27 NOTE — PATIENT INSTRUCTIONS
Labs -LFTs  Prednisone resume 10 mg daily for a week then 7.5 mg daily for a week then 5 mg daily thereafter  Continue folic acid 1 mg do not take methotrexate yet  Can take gabapentin 300 mg once daily at 7 PM as needed  Vitamin D 50,000 units once weekly for now  Return to clinic in 2 weeks call if any issues

## 2025-02-11 DIAGNOSIS — R70.0 ESR RAISED: ICD-10-CM

## 2025-02-11 DIAGNOSIS — R53.83 OTHER FATIGUE: ICD-10-CM

## 2025-02-11 DIAGNOSIS — Z79.52 LONG TERM (CURRENT) USE OF SYSTEMIC STEROIDS: ICD-10-CM

## 2025-02-11 DIAGNOSIS — E03.8 OTHER SPECIFIED HYPOTHYROIDISM: ICD-10-CM

## 2025-02-11 DIAGNOSIS — Z79.899 LONG TERM CURRENT USE OF IMMUNOSUPPRESSIVE DRUG: ICD-10-CM

## 2025-02-11 DIAGNOSIS — R79.82 CRP ELEVATED: ICD-10-CM

## 2025-02-11 DIAGNOSIS — R89.4 SEROLOGIC ABNORMALITY: ICD-10-CM

## 2025-02-11 DIAGNOSIS — Z79.899 HIGH RISK MEDICATION USE: ICD-10-CM

## 2025-02-11 DIAGNOSIS — M06.4 INFLAMMATORY POLYARTHRITIS (HCC): ICD-10-CM

## 2025-02-11 DIAGNOSIS — M79.10 MYALGIA: ICD-10-CM

## 2025-02-11 LAB
ALBUMIN SERPL-MCNC: 3.7 G/DL (ref 3.5–5)
ALBUMIN/GLOB SERPL: 1.1 (ref 1–1.9)
ALP SERPL-CCNC: 85 U/L (ref 35–104)
ALT SERPL-CCNC: 36 U/L (ref 8–45)
AST SERPL-CCNC: 23 U/L (ref 15–37)
BILIRUB DIRECT SERPL-MCNC: 0.2 MG/DL (ref 0–0.4)
BILIRUB SERPL-MCNC: 0.5 MG/DL (ref 0–1.2)
GLOBULIN SER CALC-MCNC: 3.3 G/DL (ref 2.3–3.5)
PROT SERPL-MCNC: 6.9 G/DL (ref 6.3–8.2)

## 2025-02-12 ENCOUNTER — OFFICE VISIT (OUTPATIENT)
Dept: RHEUMATOLOGY | Age: 45
End: 2025-02-12
Payer: COMMERCIAL

## 2025-02-12 VITALS
SYSTOLIC BLOOD PRESSURE: 112 MMHG | WEIGHT: 209.2 LBS | DIASTOLIC BLOOD PRESSURE: 80 MMHG | HEART RATE: 83 BPM | BODY MASS INDEX: 41.07 KG/M2 | HEIGHT: 60 IN

## 2025-02-12 DIAGNOSIS — M06.4 INFLAMMATORY POLYARTHRITIS (HCC): Primary | ICD-10-CM

## 2025-02-12 DIAGNOSIS — R70.0 ESR RAISED: ICD-10-CM

## 2025-02-12 DIAGNOSIS — Z79.899 LONG TERM CURRENT USE OF IMMUNOSUPPRESSIVE DRUG: ICD-10-CM

## 2025-02-12 DIAGNOSIS — E03.8 OTHER SPECIFIED HYPOTHYROIDISM: ICD-10-CM

## 2025-02-12 DIAGNOSIS — M79.10 MYALGIA: ICD-10-CM

## 2025-02-12 DIAGNOSIS — Z79.899 HIGH RISK MEDICATION USE: ICD-10-CM

## 2025-02-12 DIAGNOSIS — R79.89 LFT ELEVATION: ICD-10-CM

## 2025-02-12 DIAGNOSIS — R89.4 SEROLOGIC ABNORMALITY: ICD-10-CM

## 2025-02-12 DIAGNOSIS — Z79.52 LONG TERM (CURRENT) USE OF SYSTEMIC STEROIDS: ICD-10-CM

## 2025-02-12 DIAGNOSIS — E55.9 HYPOVITAMINOSIS D: ICD-10-CM

## 2025-02-12 DIAGNOSIS — R79.82 CRP ELEVATED: ICD-10-CM

## 2025-02-12 DIAGNOSIS — R53.83 OTHER FATIGUE: ICD-10-CM

## 2025-02-12 PROCEDURE — 99215 OFFICE O/P EST HI 40 MIN: CPT | Performed by: INTERNAL MEDICINE

## 2025-02-12 NOTE — PATIENT INSTRUCTIONS
Labs -CBC, CMP before next visit  Prednisone can cut down to 5 mg daily for the next 2 weeks and then as needed  Resume methotrexate 4 pills which is 10 mg altogether once weekly with daily folic acid 1 mg   Vitamin D 50,000 units once weekly for now  Can take gabapentin 300 mg once daily at 7 PM as needed  Return to clinic in 4-5 weeks with labs, call if any issues,

## 2025-02-12 NOTE — PROGRESS NOTES
Subjective:      HPI:        Permanent history    Mrs. Thompson is a very pleasant 44-year-old  lady with past medical history of DVT on long-term anticoagulation who presents for evaluation of possible inflammatory polyarthritis.    Patient reports - 6mths ago she was fine, end of Aug 2024, hurt knee, turned wrong way, heard pop  ,took advil ice, went to Urgent Care, took xrays, no fx, given mobic, sent to Ortho, given steroid shot, helped for 4 days, went back to Ortho, had Mri, results were good for knee but symptoms persisted then had MRI femur, went to PCP for new onset/worsening headaches and knee pain.  She reports headache started after she was given tramadol one of the ER visits.  After this over the next 2 months she went to ER 3 more times, numbness on lt side of scalp,pcp gave her a tylenol cocktail -the headaches as mentioned got worse after taking tramadol but even after she stopped the tramadol headaches contined.  Again leg pain continued, went to the ER again for slowly leg pain worsened to involve both legs and whole legs.  Almost looked like she was trying to l earn to walk, so then she seen another time at [place] ER where she was, given prednisone and lyrica anf d that was the 1st night she was able to sleep, next morning she could walk again. Currently taking Pred 10mg bid.  said she had PMR and GCA. The headaches have subsided, head still feels weird, throbbing over temples.  During all of this patient was noted to have DVT, for which she was started on anticoagulation and recently followed up outpatient with hematology.  Has also been seen by neurology both ER and outpatient.  No definitive diagnosis.    Has clearly had dramatic response to prednisone and every time she tapers down symptoms slowly start worsening again.    Denies any family history of rheumatoid arthritis, lupus, Sjogren's, has 2 children who are otherwise healthy no significant history is either in herself or

## 2025-03-04 DIAGNOSIS — M06.4 INFLAMMATORY POLYARTHRITIS (HCC): Primary | ICD-10-CM

## 2025-03-04 DIAGNOSIS — Z79.899 LONG TERM CURRENT USE OF IMMUNOSUPPRESSIVE DRUG: ICD-10-CM

## 2025-03-04 DIAGNOSIS — Z79.52 LONG TERM (CURRENT) USE OF SYSTEMIC STEROIDS: ICD-10-CM

## 2025-03-04 DIAGNOSIS — R79.89 LFT ELEVATION: ICD-10-CM

## 2025-03-04 DIAGNOSIS — R89.4 SEROLOGIC ABNORMALITY: ICD-10-CM

## 2025-03-04 DIAGNOSIS — R53.83 OTHER FATIGUE: ICD-10-CM

## 2025-03-04 DIAGNOSIS — M79.10 MYALGIA: ICD-10-CM

## 2025-03-04 DIAGNOSIS — Z79.899 HIGH RISK MEDICATION USE: ICD-10-CM

## 2025-03-06 DIAGNOSIS — M79.10 MYALGIA: ICD-10-CM

## 2025-03-06 DIAGNOSIS — R53.83 OTHER FATIGUE: ICD-10-CM

## 2025-03-06 DIAGNOSIS — M06.4 INFLAMMATORY POLYARTHRITIS (HCC): ICD-10-CM

## 2025-03-06 DIAGNOSIS — Z79.52 LONG TERM (CURRENT) USE OF SYSTEMIC STEROIDS: ICD-10-CM

## 2025-03-06 DIAGNOSIS — R89.4 SEROLOGIC ABNORMALITY: ICD-10-CM

## 2025-03-06 DIAGNOSIS — R79.89 LFT ELEVATION: ICD-10-CM

## 2025-03-06 DIAGNOSIS — Z79.899 LONG TERM CURRENT USE OF IMMUNOSUPPRESSIVE DRUG: ICD-10-CM

## 2025-03-06 DIAGNOSIS — Z79.899 HIGH RISK MEDICATION USE: ICD-10-CM

## 2025-03-06 LAB
ALBUMIN SERPL-MCNC: 3.6 G/DL (ref 3.5–5)
ALBUMIN/GLOB SERPL: 1 (ref 1–1.9)
ALP SERPL-CCNC: 86 U/L (ref 35–104)
ALT SERPL-CCNC: 36 U/L (ref 8–45)
ANION GAP SERPL CALC-SCNC: 9 MMOL/L (ref 7–16)
AST SERPL-CCNC: 36 U/L (ref 15–37)
BILIRUB SERPL-MCNC: 0.2 MG/DL (ref 0–1.2)
BUN SERPL-MCNC: 18 MG/DL (ref 6–23)
CALCIUM SERPL-MCNC: 9.8 MG/DL (ref 8.8–10.2)
CHLORIDE SERPL-SCNC: 106 MMOL/L (ref 98–107)
CO2 SERPL-SCNC: 27 MMOL/L (ref 20–29)
CREAT SERPL-MCNC: 0.72 MG/DL (ref 0.6–1.1)
ERYTHROCYTE [DISTWIDTH] IN BLOOD BY AUTOMATED COUNT: 14.7 % (ref 11.9–14.6)
GLOBULIN SER CALC-MCNC: 3.6 G/DL (ref 2.3–3.5)
GLUCOSE SERPL-MCNC: 87 MG/DL (ref 70–99)
HCT VFR BLD AUTO: 38.4 % (ref 35.8–46.3)
HGB BLD-MCNC: 12.3 G/DL (ref 11.7–15.4)
MCH RBC QN AUTO: 27.7 PG (ref 26.1–32.9)
MCHC RBC AUTO-ENTMCNC: 32 G/DL (ref 31.4–35)
MCV RBC AUTO: 86.5 FL (ref 82–102)
NRBC # BLD: 0 K/UL (ref 0–0.2)
PLATELET # BLD AUTO: 208 K/UL (ref 150–450)
PMV BLD AUTO: 13.9 FL (ref 9.4–12.3)
POTASSIUM SERPL-SCNC: 4.7 MMOL/L (ref 3.5–5.1)
PROT SERPL-MCNC: 7.2 G/DL (ref 6.3–8.2)
RBC # BLD AUTO: 4.44 M/UL (ref 4.05–5.2)
SODIUM SERPL-SCNC: 142 MMOL/L (ref 136–145)
WBC # BLD AUTO: 7.2 K/UL (ref 4.3–11.1)

## 2025-03-12 ENCOUNTER — OFFICE VISIT (OUTPATIENT)
Dept: RHEUMATOLOGY | Age: 45
End: 2025-03-12
Payer: COMMERCIAL

## 2025-03-12 VITALS
HEIGHT: 60 IN | SYSTOLIC BLOOD PRESSURE: 112 MMHG | WEIGHT: 212 LBS | DIASTOLIC BLOOD PRESSURE: 75 MMHG | HEART RATE: 81 BPM | BODY MASS INDEX: 41.62 KG/M2

## 2025-03-12 DIAGNOSIS — Z79.899 LONG TERM CURRENT USE OF IMMUNOSUPPRESSIVE DRUG: ICD-10-CM

## 2025-03-12 DIAGNOSIS — E55.9 HYPOVITAMINOSIS D: ICD-10-CM

## 2025-03-12 DIAGNOSIS — M06.4 INFLAMMATORY POLYARTHRITIS (HCC): ICD-10-CM

## 2025-03-12 DIAGNOSIS — Z79.52 LONG TERM (CURRENT) USE OF SYSTEMIC STEROIDS: ICD-10-CM

## 2025-03-12 DIAGNOSIS — M79.10 MYALGIA: ICD-10-CM

## 2025-03-12 DIAGNOSIS — M25.40 JOINT SWELLING: ICD-10-CM

## 2025-03-12 DIAGNOSIS — R89.4 SEROLOGIC ABNORMALITY: ICD-10-CM

## 2025-03-12 DIAGNOSIS — G62.9 NEUROPATHY: ICD-10-CM

## 2025-03-12 DIAGNOSIS — Z79.899 HIGH RISK MEDICATION USE: ICD-10-CM

## 2025-03-12 DIAGNOSIS — R53.83 OTHER FATIGUE: ICD-10-CM

## 2025-03-12 DIAGNOSIS — I82.409 ACUTE DEEP VEIN THROMBOSIS (DVT) OF OTHER VEIN OF LOWER EXTREMITY, UNSPECIFIED LATERALITY (HCC): ICD-10-CM

## 2025-03-12 PROCEDURE — 99215 OFFICE O/P EST HI 40 MIN: CPT | Performed by: INTERNAL MEDICINE

## 2025-03-12 RX ORDER — FOLIC ACID 1 MG/1
1 TABLET ORAL DAILY
Qty: 90 TABLET | Refills: 0 | Status: SHIPPED | OUTPATIENT
Start: 2025-03-12

## 2025-03-12 RX ORDER — METHOTREXATE 2.5 MG/1
TABLET ORAL
Qty: 72 TABLET | Refills: 0 | Status: SHIPPED | OUTPATIENT
Start: 2025-03-12

## 2025-03-12 RX ORDER — ERGOCALCIFEROL 1.25 MG/1
50000 CAPSULE, LIQUID FILLED ORAL WEEKLY
Qty: 12 CAPSULE | Refills: 0 | Status: SHIPPED | OUTPATIENT
Start: 2025-03-12

## 2025-03-12 NOTE — PROGRESS NOTES
Subjective:      HPI:        Permanent history    Mrs. Thompson is a very pleasant 44-year-old  lady with past medical history of DVT on long-term anticoagulation who presents for evaluation of possible inflammatory polyarthritis.    Patient reports - 6mths ago she was fine, end of Aug 2024, hurt knee, turned wrong way, heard pop  ,took advil ice, went to Urgent Care, took xrays, no fx, given mobic, sent to Ortho, given steroid shot, helped for 4 days, went back to Ortho, had Mri, results were good for knee but symptoms persisted then had MRI femur, went to PCP for new onset/worsening headaches and knee pain.  She reports headache started after she was given tramadol one of the ER visits.  After this over the next 2 months she went to ER 3 more times, numbness on lt side of scalp,pcp gave her a tylenol cocktail -the headaches as mentioned got worse after taking tramadol but even after she stopped the tramadol headaches contined.  Again leg pain continued, went to the ER again for slowly leg pain worsened to involve both legs and whole legs.  Almost looked like she was trying to l earn to walk, so then she seen another time at [place] ER where she was, given prednisone and lyrica anf d that was the 1st night she was able to sleep, next morning she could walk again. Currently taking Pred 10mg bid.  said she had PMR and GCA. The headaches have subsided, head still feels weird, throbbing over temples.  During all of this patient was noted to have DVT, for which she was started on anticoagulation and recently followed up outpatient with hematology.  Has also been seen by neurology both ER and outpatient.  No definitive diagnosis.    Has clearly had dramatic response to prednisone and every time she tapers down symptoms slowly start worsening again.    Denies any family history of rheumatoid arthritis, lupus, Sjogren's, has 2 children who are otherwise healthy no significant history is either in herself or

## 2025-03-12 NOTE — PATIENT INSTRUCTIONS
Labs -CBC, CMP before next visit  Prednisone can take 5 mg once daily as needed  Increase methotrexate to 6 pills which is 15 mg altogether once weekly with daily folic acid 1 mg   Vitamin D 50,000 units once weekly for now  Can take gabapentin 300-600mg mg once daily at 7 PM as needed  Return to clinic in 4-5 weeks with labs, call if any issues,

## 2025-04-21 DIAGNOSIS — Z79.899 HIGH RISK MEDICATION USE: ICD-10-CM

## 2025-04-21 DIAGNOSIS — Z79.52 LONG TERM (CURRENT) USE OF SYSTEMIC STEROIDS: ICD-10-CM

## 2025-04-21 DIAGNOSIS — M06.4 INFLAMMATORY POLYARTHRITIS (HCC): ICD-10-CM

## 2025-04-21 DIAGNOSIS — Z79.60 LONG TERM CURRENT USE OF IMMUNOSUPPRESSIVE DRUG: ICD-10-CM

## 2025-04-21 DIAGNOSIS — M79.10 MYALGIA: ICD-10-CM

## 2025-04-21 DIAGNOSIS — R89.4 SEROLOGIC ABNORMALITY: ICD-10-CM

## 2025-04-21 DIAGNOSIS — R53.83 OTHER FATIGUE: ICD-10-CM

## 2025-04-21 DIAGNOSIS — R79.89 LFT ELEVATION: ICD-10-CM

## 2025-04-21 LAB
ALBUMIN SERPL-MCNC: 3.9 G/DL (ref 3.5–5)
ALBUMIN/GLOB SERPL: 1.2 (ref 1–1.9)
ALP SERPL-CCNC: 64 U/L (ref 35–104)
ALT SERPL-CCNC: 28 U/L (ref 8–45)
ANION GAP SERPL CALC-SCNC: 11 MMOL/L (ref 7–16)
AST SERPL-CCNC: 23 U/L (ref 15–37)
BILIRUB SERPL-MCNC: 0.4 MG/DL (ref 0–1.2)
BUN SERPL-MCNC: 17 MG/DL (ref 6–23)
CALCIUM SERPL-MCNC: 10 MG/DL (ref 8.8–10.2)
CHLORIDE SERPL-SCNC: 103 MMOL/L (ref 98–107)
CO2 SERPL-SCNC: 27 MMOL/L (ref 20–29)
CREAT SERPL-MCNC: 0.84 MG/DL (ref 0.6–1.1)
ERYTHROCYTE [DISTWIDTH] IN BLOOD BY AUTOMATED COUNT: 15 % (ref 11.9–14.6)
GLOBULIN SER CALC-MCNC: 3.3 G/DL (ref 2.3–3.5)
GLUCOSE SERPL-MCNC: 100 MG/DL (ref 70–99)
HCT VFR BLD AUTO: 42.3 % (ref 35.8–46.3)
HGB BLD-MCNC: 13.6 G/DL (ref 11.7–15.4)
MCH RBC QN AUTO: 28 PG (ref 26.1–32.9)
MCHC RBC AUTO-ENTMCNC: 32.2 G/DL (ref 31.4–35)
MCV RBC AUTO: 87 FL (ref 82–102)
NRBC # BLD: 0 K/UL (ref 0–0.2)
PLATELET # BLD AUTO: 231 K/UL (ref 150–450)
PMV BLD AUTO: 13.8 FL (ref 9.4–12.3)
POTASSIUM SERPL-SCNC: 4.6 MMOL/L (ref 3.5–5.1)
PROT SERPL-MCNC: 7.3 G/DL (ref 6.3–8.2)
RBC # BLD AUTO: 4.86 M/UL (ref 4.05–5.2)
SODIUM SERPL-SCNC: 141 MMOL/L (ref 136–145)
WBC # BLD AUTO: 5.8 K/UL (ref 4.3–11.1)

## 2025-04-21 NOTE — PROGRESS NOTES
Subjective:      HPI:        Permanent history    Mrs. Thompson is a very pleasant 45-year-old  lady with past medical history of DVT on long-term anticoagulation who presents for evaluation of possible inflammatory polyarthritis.    Patient reports - 6mths ago she was fine, end of Aug 2024, hurt knee, turned wrong way, heard pop  ,took advil ice, went to Urgent Care, took xrays, no fx, given mobic, sent to Ortho, given steroid shot, helped for 4 days, went back to Ortho, had Mri, results were good for knee but symptoms persisted then had MRI femur, went to PCP for new onset/worsening headaches and knee pain.  She reports headache started after she was given tramadol one of the ER visits.  After this over the next 2 months she went to ER 3 more times, numbness on lt side of scalp,pcp gave her a tylenol cocktail -the headaches as mentioned got worse after taking tramadol but even after she stopped the tramadol headaches contined.  Again leg pain continued, went to the ER again for slowly leg pain worsened to involve both legs and whole legs.  Almost looked like she was trying to l earn to walk, so then she seen another time at [place] ER where she was, given prednisone and lyrica anf d that was the 1st night she was able to sleep, next morning she could walk again. Currently taking Pred 10mg bid.  said she had PMR and GCA. The headaches have subsided, head still feels weird, throbbing over temples.  During all of this patient was noted to have DVT, for which she was started on anticoagulation and recently followed up outpatient with hematology.  Has also been seen by neurology both ER and outpatient.  No definitive diagnosis.    Has clearly had dramatic response to prednisone and every time she tapers down symptoms slowly start worsening again.    Denies any family history of rheumatoid arthritis, lupus, Sjogren's, has 2 children who are otherwise healthy no significant history is either in herself or

## 2025-04-22 ENCOUNTER — OFFICE VISIT (OUTPATIENT)
Dept: RHEUMATOLOGY | Age: 45
End: 2025-04-22
Payer: COMMERCIAL

## 2025-04-22 VITALS
HEIGHT: 60 IN | SYSTOLIC BLOOD PRESSURE: 126 MMHG | DIASTOLIC BLOOD PRESSURE: 82 MMHG | HEART RATE: 88 BPM | BODY MASS INDEX: 40.84 KG/M2 | WEIGHT: 208 LBS

## 2025-04-22 DIAGNOSIS — R53.83 OTHER FATIGUE: ICD-10-CM

## 2025-04-22 DIAGNOSIS — M06.4 INFLAMMATORY POLYARTHRITIS (HCC): Primary | ICD-10-CM

## 2025-04-22 DIAGNOSIS — Z79.60 LONG TERM CURRENT USE OF IMMUNOSUPPRESSIVE DRUG: ICD-10-CM

## 2025-04-22 DIAGNOSIS — Z79.52 LONG TERM (CURRENT) USE OF SYSTEMIC STEROIDS: ICD-10-CM

## 2025-04-22 DIAGNOSIS — R89.4 SEROLOGIC ABNORMALITY: ICD-10-CM

## 2025-04-22 DIAGNOSIS — M79.10 MYALGIA: ICD-10-CM

## 2025-04-22 DIAGNOSIS — Z79.899 HIGH RISK MEDICATION USE: ICD-10-CM

## 2025-04-22 DIAGNOSIS — I82.409 ACUTE DEEP VEIN THROMBOSIS (DVT) OF OTHER VEIN OF LOWER EXTREMITY, UNSPECIFIED LATERALITY (HCC): ICD-10-CM

## 2025-04-22 DIAGNOSIS — B37.31 VAGINAL CANDIDIASIS: ICD-10-CM

## 2025-04-22 DIAGNOSIS — M25.40 JOINT SWELLING: ICD-10-CM

## 2025-04-22 DIAGNOSIS — E55.9 HYPOVITAMINOSIS D: ICD-10-CM

## 2025-04-22 DIAGNOSIS — G62.9 NEUROPATHY: ICD-10-CM

## 2025-04-22 DIAGNOSIS — T50.905A MEDICATION SIDE EFFECT, INITIAL ENCOUNTER: ICD-10-CM

## 2025-04-22 PROCEDURE — 99215 OFFICE O/P EST HI 40 MIN: CPT | Performed by: INTERNAL MEDICINE

## 2025-04-22 RX ORDER — ERGOCALCIFEROL 1.25 MG/1
50000 CAPSULE, LIQUID FILLED ORAL WEEKLY
Qty: 12 CAPSULE | Refills: 0 | Status: SHIPPED | OUTPATIENT
Start: 2025-04-22

## 2025-04-22 RX ORDER — FOLIC ACID 1 MG/1
2 TABLET ORAL DAILY
Qty: 180 TABLET | Refills: 0 | Status: SHIPPED | OUTPATIENT
Start: 2025-04-22

## 2025-04-22 RX ORDER — METHOTREXATE 25 MG/ML
INJECTION, SOLUTION INTRAMUSCULAR; INTRATHECAL; INTRAVENOUS; SUBCUTANEOUS
Qty: 8 ML | Refills: 0 | Status: SHIPPED | OUTPATIENT
Start: 2025-04-22

## 2025-04-22 RX ORDER — GABAPENTIN 300 MG/1
CAPSULE ORAL
Qty: 180 CAPSULE | Refills: 0 | Status: SHIPPED | OUTPATIENT
Start: 2025-04-22 | End: 2026-04-27

## 2025-04-22 NOTE — PATIENT INSTRUCTIONS
Labs -CBC, CMP , esr before next visit  Prednisone can take 5 mg once daily as needed  Change methotrexate to injectable and increase dose to 20 mg once weekly with daily folic acid 2 mg   Vitamin D 50,000 units once weekly for now  Can take gabapentin 300-600mg mg once daily at 7 PM as needed  Return to clinic in 4-5 weeks with labs, call if any issues,    5WK,Labs BS prior

## 2025-05-28 DIAGNOSIS — R70.0 ESR RAISED: ICD-10-CM

## 2025-05-28 DIAGNOSIS — E55.9 HYPOVITAMINOSIS D: ICD-10-CM

## 2025-05-28 DIAGNOSIS — R53.83 OTHER FATIGUE: ICD-10-CM

## 2025-05-28 DIAGNOSIS — Z79.899 HIGH RISK MEDICATION USE: ICD-10-CM

## 2025-05-28 DIAGNOSIS — M06.4 INFLAMMATORY POLYARTHRITIS (HCC): Primary | ICD-10-CM

## 2025-05-28 DIAGNOSIS — R79.82 CRP ELEVATED: ICD-10-CM

## 2025-05-28 DIAGNOSIS — Z79.60 LONG TERM CURRENT USE OF IMMUNOSUPPRESSIVE DRUG: ICD-10-CM

## 2025-05-28 DIAGNOSIS — I82.409 ACUTE DEEP VEIN THROMBOSIS (DVT) OF OTHER VEIN OF LOWER EXTREMITY, UNSPECIFIED LATERALITY (HCC): ICD-10-CM

## 2025-05-28 DIAGNOSIS — Z79.52 LONG TERM (CURRENT) USE OF SYSTEMIC STEROIDS: ICD-10-CM

## 2025-05-28 DIAGNOSIS — M79.10 MYALGIA: ICD-10-CM

## 2025-05-28 DIAGNOSIS — R89.4 SEROLOGIC ABNORMALITY: ICD-10-CM

## 2025-06-03 DIAGNOSIS — Z79.52 LONG TERM (CURRENT) USE OF SYSTEMIC STEROIDS: ICD-10-CM

## 2025-06-03 DIAGNOSIS — Z79.899 HIGH RISK MEDICATION USE: ICD-10-CM

## 2025-06-03 DIAGNOSIS — R53.83 OTHER FATIGUE: ICD-10-CM

## 2025-06-03 DIAGNOSIS — M79.10 MYALGIA: ICD-10-CM

## 2025-06-03 DIAGNOSIS — R70.0 ESR RAISED: ICD-10-CM

## 2025-06-03 DIAGNOSIS — M06.4 INFLAMMATORY POLYARTHRITIS (HCC): ICD-10-CM

## 2025-06-03 DIAGNOSIS — I82.409 ACUTE DEEP VEIN THROMBOSIS (DVT) OF OTHER VEIN OF LOWER EXTREMITY, UNSPECIFIED LATERALITY (HCC): ICD-10-CM

## 2025-06-03 DIAGNOSIS — Z79.60 LONG TERM CURRENT USE OF IMMUNOSUPPRESSIVE DRUG: ICD-10-CM

## 2025-06-03 DIAGNOSIS — E55.9 HYPOVITAMINOSIS D: ICD-10-CM

## 2025-06-03 DIAGNOSIS — R89.4 SEROLOGIC ABNORMALITY: ICD-10-CM

## 2025-06-03 DIAGNOSIS — R79.82 CRP ELEVATED: ICD-10-CM

## 2025-06-03 LAB
ALBUMIN SERPL-MCNC: 3.7 G/DL (ref 3.5–5)
ALBUMIN/GLOB SERPL: 1.1 (ref 1–1.9)
ALP SERPL-CCNC: 68 U/L (ref 35–104)
ALT SERPL-CCNC: 37 U/L (ref 8–45)
ANION GAP SERPL CALC-SCNC: 12 MMOL/L (ref 7–16)
AST SERPL-CCNC: 29 U/L (ref 15–37)
BILIRUB SERPL-MCNC: 0.3 MG/DL (ref 0–1.2)
BUN SERPL-MCNC: 17 MG/DL (ref 6–23)
CALCIUM SERPL-MCNC: 9.6 MG/DL (ref 8.8–10.2)
CHLORIDE SERPL-SCNC: 104 MMOL/L (ref 98–107)
CO2 SERPL-SCNC: 26 MMOL/L (ref 20–29)
CREAT SERPL-MCNC: 0.74 MG/DL (ref 0.6–1.1)
ERYTHROCYTE [DISTWIDTH] IN BLOOD BY AUTOMATED COUNT: 15 % (ref 11.9–14.6)
ERYTHROCYTE [SEDIMENTATION RATE] IN BLOOD: 11 MM/HR (ref 0–20)
GLOBULIN SER CALC-MCNC: 3.5 G/DL (ref 2.3–3.5)
GLUCOSE SERPL-MCNC: 77 MG/DL (ref 70–99)
HCT VFR BLD AUTO: 39.9 % (ref 35.8–46.3)
HGB BLD-MCNC: 13.2 G/DL (ref 11.7–15.4)
MCH RBC QN AUTO: 28.6 PG (ref 26.1–32.9)
MCHC RBC AUTO-ENTMCNC: 33.1 G/DL (ref 31.4–35)
MCV RBC AUTO: 86.6 FL (ref 82–102)
NRBC # BLD: 0 K/UL (ref 0–0.2)
PLATELET # BLD AUTO: 191 K/UL (ref 150–450)
PMV BLD AUTO: 13.9 FL (ref 9.4–12.3)
POTASSIUM SERPL-SCNC: 4.1 MMOL/L (ref 3.5–5.1)
PROT SERPL-MCNC: 7.1 G/DL (ref 6.3–8.2)
RBC # BLD AUTO: 4.61 M/UL (ref 4.05–5.2)
SODIUM SERPL-SCNC: 142 MMOL/L (ref 136–145)
WBC # BLD AUTO: 5.8 K/UL (ref 4.3–11.1)

## 2025-06-03 NOTE — PROGRESS NOTES
CHOLECYSTECTOMY  01/10/2025    SALPINGECTOMY Bilateral 12/2022    TONSILLECTOMY         Family History   Problem Relation Age of Onset    Thyroid Disease Mother     Hypertension Mother     Other Mother         Thyroid    Hypothyroidism Mother     Cancer Father         Kidney    Stroke Father     Diabetes Father     Heart Attack Father     Kidney Disease Father     Coronary Art Dis Father     Heart Disease Father     Other Sister         vein in leg issues-surgery before    Thyroid Disease Sister     Hypothyroidism Sister     Arthritis Brother     No Known Problems Child     No Known Problems Child        Social History     Socioeconomic History    Marital status:    Tobacco Use    Smoking status: Former     Types: Cigarettes    Smokeless tobacco: Never    Tobacco comments:     Quit over 10 years ago   Substance and Sexual Activity    Alcohol use: Not Currently     Alcohol/week: 1.0 - 3.0 standard drink of alcohol     Types: 1 - 3 Glasses of wine per week    Drug use: Never    Sexual activity: Yes     Partners: Male     Social Drivers of Health     Financial Resource Strain: Low Risk  (3/12/2025)    Received from [facility]    Financial Resource Strain     Difficulty Paying Living Expenses: Not hard at all     Difficulty Paying Medical Expenses: No   Food Insecurity: No Food Insecurity (3/12/2025)    Received from [facility]    Food Insecurity     Worried about Running Out of Food in the Last Year: Never true     Ran Out of Food in the Last Year: Never true   Transportation Needs: No Transportation Needs (3/12/2025)    Received from [facility]    Transportation Needs     Lack of Transportation: No   Physical Activity: Insufficiently Active (1/16/2025)    Received from [facility]    Physical Activity     Days of Exercise per Week: 3 days     On average, how many minutes do you exercise per day?: 30     Total Minutes of Exercise Per Week: 90   Stress: No Stress Concern Present (3/12/2025)    
Male

## 2025-06-04 ENCOUNTER — TELEPHONE (OUTPATIENT)
Dept: RHEUMATOLOGY | Age: 45
End: 2025-06-04

## 2025-06-04 ENCOUNTER — OFFICE VISIT (OUTPATIENT)
Dept: RHEUMATOLOGY | Age: 45
End: 2025-06-04
Payer: COMMERCIAL

## 2025-06-04 VITALS
BODY MASS INDEX: 41.03 KG/M2 | HEART RATE: 83 BPM | WEIGHT: 209 LBS | HEIGHT: 60 IN | SYSTOLIC BLOOD PRESSURE: 113 MMHG | DIASTOLIC BLOOD PRESSURE: 77 MMHG

## 2025-06-04 DIAGNOSIS — M06.4 INFLAMMATORY POLYARTHRITIS (HCC): Primary | ICD-10-CM

## 2025-06-04 DIAGNOSIS — R89.4 SEROLOGIC ABNORMALITY: ICD-10-CM

## 2025-06-04 DIAGNOSIS — Z79.60 LONG TERM CURRENT USE OF IMMUNOSUPPRESSIVE DRUG: ICD-10-CM

## 2025-06-04 DIAGNOSIS — E03.8 OTHER SPECIFIED HYPOTHYROIDISM: ICD-10-CM

## 2025-06-04 DIAGNOSIS — M79.10 MYALGIA: ICD-10-CM

## 2025-06-04 DIAGNOSIS — R79.82 CRP ELEVATED: ICD-10-CM

## 2025-06-04 DIAGNOSIS — Z79.899 HIGH RISK MEDICATION USE: ICD-10-CM

## 2025-06-04 DIAGNOSIS — R53.83 OTHER FATIGUE: ICD-10-CM

## 2025-06-04 DIAGNOSIS — B37.31 VAGINAL CANDIDIASIS: ICD-10-CM

## 2025-06-04 DIAGNOSIS — E55.9 HYPOVITAMINOSIS D: ICD-10-CM

## 2025-06-04 DIAGNOSIS — Z79.52 LONG TERM (CURRENT) USE OF SYSTEMIC STEROIDS: ICD-10-CM

## 2025-06-04 DIAGNOSIS — M25.40 JOINT SWELLING: ICD-10-CM

## 2025-06-04 DIAGNOSIS — I82.409 ACUTE DEEP VEIN THROMBOSIS (DVT) OF OTHER VEIN OF LOWER EXTREMITY, UNSPECIFIED LATERALITY (HCC): ICD-10-CM

## 2025-06-04 DIAGNOSIS — G62.9 NEUROPATHY: ICD-10-CM

## 2025-06-04 DIAGNOSIS — M06.4 INFLAMMATORY POLYARTHRITIS (HCC): ICD-10-CM

## 2025-06-04 DIAGNOSIS — R70.0 ESR RAISED: ICD-10-CM

## 2025-06-04 LAB
FERRITIN SERPL-MCNC: 42 NG/ML (ref 8–388)
IRON SATN MFR SERPL: 15 % (ref 20–50)
IRON SERPL-MCNC: 49 UG/DL (ref 35–100)
TIBC SERPL-MCNC: 328 UG/DL (ref 240–450)
UIBC SERPL-MCNC: 279 UG/DL (ref 112–347)

## 2025-06-04 PROCEDURE — 99215 OFFICE O/P EST HI 40 MIN: CPT | Performed by: INTERNAL MEDICINE

## 2025-06-04 NOTE — TELEPHONE ENCOUNTER
OV 6/4/25, Iron studies resulted for review    Component      Latest Ref Rng 6/4/2025   Iron      35 - 100 ug/dL 49    TIBC      240 - 450 ug/dL 328    Iron % Saturation      20 - 50 % 15 (L)    UIBC      112.0 - 347.0 ug/dL 279.0    Ferritin      8 - 388 NG/ML 42       Legend:  (L) Low        Plan:      Labs -CBC, CMP , esr before next visit  Prednisone can take 5 mg once daily as needed  Continue methotrexate IM 20 mg once weekly with daily folic acid 2 mg   Vitamin D 50,000 units once weekly for now  Can take gabapentin 300-600mg mg once daily at 7 PM as needed  Labs today iron studies including ferritin   Return to clinic in 2 months with labs, call if any issues,

## 2025-06-04 NOTE — PATIENT INSTRUCTIONS
Labs -CBC, CMP , esr before next visit  Prednisone can take 5 mg once daily as needed  Continue methotrexate IM 20 mg once weekly with daily folic acid 2 mg   Vitamin D 50,000 units once weekly for now  Can take gabapentin 300-600mg mg once daily at 7 PM as needed  Labs today iron studies including ferritin   Return to clinic in 2 months with labs, call if any issues,    2MO,Labs BS NOW & Prior

## 2025-06-05 RX ORDER — GABAPENTIN 300 MG/1
CAPSULE ORAL
Qty: 180 CAPSULE | Refills: 0 | Status: SHIPPED | OUTPATIENT
Start: 2025-06-05 | End: 2026-06-09

## 2025-06-05 RX ORDER — FOLIC ACID 1 MG/1
2 TABLET ORAL DAILY
Qty: 180 TABLET | Refills: 0 | Status: SHIPPED | OUTPATIENT
Start: 2025-06-05

## 2025-06-05 RX ORDER — PREDNISONE 5 MG/1
TABLET ORAL
Qty: 100 TABLET | Refills: 0 | Status: SHIPPED | OUTPATIENT
Start: 2025-06-05

## 2025-06-05 RX ORDER — METHOTREXATE 25 MG/ML
INJECTION, SOLUTION INTRAMUSCULAR; INTRATHECAL; INTRAVENOUS; SUBCUTANEOUS
Qty: 12 ML | Refills: 0 | Status: SHIPPED | OUTPATIENT
Start: 2025-06-05

## 2025-06-06 NOTE — TELEPHONE ENCOUNTER
Replied to pt thru My Chart letting her know, Per Dr Turner she is going to call [facility] and discuss pt's Iron studies. Pt replied back and is aware.  Will wait for any further instructions from Dr Turner.

## 2025-06-18 NOTE — TELEPHONE ENCOUNTER
Replied thru My chart  Hi, Dr Turner did let me know today that she did speak to your DR, their office is supposed to get in contact with you, I would give them a little time and if they dont,contact them, thank you

## 2025-08-12 DIAGNOSIS — M79.10 MYALGIA: ICD-10-CM

## 2025-08-12 DIAGNOSIS — Z79.60 LONG TERM CURRENT USE OF IMMUNOSUPPRESSIVE DRUG: ICD-10-CM

## 2025-08-12 DIAGNOSIS — Z79.899 HIGH RISK MEDICATION USE: ICD-10-CM

## 2025-08-12 DIAGNOSIS — B37.31 VAGINAL CANDIDIASIS: ICD-10-CM

## 2025-08-12 DIAGNOSIS — Z79.52 LONG TERM (CURRENT) USE OF SYSTEMIC STEROIDS: ICD-10-CM

## 2025-08-12 DIAGNOSIS — R53.83 OTHER FATIGUE: ICD-10-CM

## 2025-08-12 DIAGNOSIS — M06.4 INFLAMMATORY POLYARTHRITIS (HCC): ICD-10-CM

## 2025-08-12 DIAGNOSIS — G62.9 NEUROPATHY: ICD-10-CM

## 2025-08-12 DIAGNOSIS — M25.40 JOINT SWELLING: ICD-10-CM

## 2025-08-12 DIAGNOSIS — R89.4 SEROLOGIC ABNORMALITY: ICD-10-CM

## 2025-08-12 DIAGNOSIS — I82.409 ACUTE DEEP VEIN THROMBOSIS (DVT) OF OTHER VEIN OF LOWER EXTREMITY, UNSPECIFIED LATERALITY (HCC): ICD-10-CM

## 2025-08-12 DIAGNOSIS — E55.9 HYPOVITAMINOSIS D: ICD-10-CM

## 2025-08-13 RX ORDER — FLUCONAZOLE 150 MG/1
TABLET ORAL
Qty: 3 TABLET | Refills: 0 | OUTPATIENT
Start: 2025-08-13

## 2025-08-22 DIAGNOSIS — G62.9 NEUROPATHY: ICD-10-CM

## 2025-08-22 DIAGNOSIS — Z79.899 HIGH RISK MEDICATION USE: ICD-10-CM

## 2025-08-22 DIAGNOSIS — Z79.60 LONG TERM CURRENT USE OF IMMUNOSUPPRESSIVE DRUG: ICD-10-CM

## 2025-08-22 DIAGNOSIS — R89.4 SEROLOGIC ABNORMALITY: ICD-10-CM

## 2025-08-22 DIAGNOSIS — I82.409 ACUTE DEEP VEIN THROMBOSIS (DVT) OF OTHER VEIN OF LOWER EXTREMITY, UNSPECIFIED LATERALITY (HCC): ICD-10-CM

## 2025-08-22 DIAGNOSIS — R79.82 CRP ELEVATED: ICD-10-CM

## 2025-08-22 DIAGNOSIS — R70.0 ESR RAISED: ICD-10-CM

## 2025-08-22 DIAGNOSIS — E03.8 OTHER SPECIFIED HYPOTHYROIDISM: ICD-10-CM

## 2025-08-22 DIAGNOSIS — R53.83 OTHER FATIGUE: ICD-10-CM

## 2025-08-22 DIAGNOSIS — E55.9 HYPOVITAMINOSIS D: ICD-10-CM

## 2025-08-22 DIAGNOSIS — M06.4 INFLAMMATORY POLYARTHRITIS (HCC): ICD-10-CM

## 2025-08-22 DIAGNOSIS — M25.40 JOINT SWELLING: ICD-10-CM

## 2025-08-22 DIAGNOSIS — B37.31 VAGINAL CANDIDIASIS: ICD-10-CM

## 2025-08-22 DIAGNOSIS — Z79.52 LONG TERM (CURRENT) USE OF SYSTEMIC STEROIDS: ICD-10-CM

## 2025-08-22 DIAGNOSIS — M79.10 MYALGIA: ICD-10-CM

## 2025-08-22 LAB
ALBUMIN SERPL-MCNC: 3.7 G/DL (ref 3.5–5)
ALBUMIN/GLOB SERPL: 1.2 (ref 1–1.9)
ALP SERPL-CCNC: 89 U/L (ref 35–104)
ALT SERPL-CCNC: 103 U/L (ref 8–45)
ANION GAP SERPL CALC-SCNC: 12 MMOL/L (ref 7–16)
AST SERPL-CCNC: 67 U/L (ref 15–37)
BILIRUB SERPL-MCNC: 0.5 MG/DL (ref 0–1.2)
BUN SERPL-MCNC: 17 MG/DL (ref 6–23)
CALCIUM SERPL-MCNC: 9.7 MG/DL (ref 8.8–10.2)
CHLORIDE SERPL-SCNC: 103 MMOL/L (ref 98–107)
CO2 SERPL-SCNC: 26 MMOL/L (ref 20–29)
CREAT SERPL-MCNC: 0.81 MG/DL (ref 0.6–1.1)
ERYTHROCYTE [DISTWIDTH] IN BLOOD BY AUTOMATED COUNT: 15.3 % (ref 11.9–14.6)
ERYTHROCYTE [SEDIMENTATION RATE] IN BLOOD: 16 MM/HR (ref 0–20)
GLOBULIN SER CALC-MCNC: 3.1 G/DL (ref 2.3–3.5)
GLUCOSE SERPL-MCNC: 88 MG/DL (ref 70–99)
HCT VFR BLD AUTO: 42.6 % (ref 35.8–46.3)
HGB BLD-MCNC: 13.3 G/DL (ref 11.7–15.4)
MCH RBC QN AUTO: 29.3 PG (ref 26.1–32.9)
MCHC RBC AUTO-ENTMCNC: 31.2 G/DL (ref 31.4–35)
MCV RBC AUTO: 93.8 FL (ref 82–102)
NRBC # BLD: 0 K/UL (ref 0–0.2)
PLATELET # BLD AUTO: 185 K/UL (ref 150–450)
PMV BLD AUTO: 13.4 FL (ref 9.4–12.3)
POTASSIUM SERPL-SCNC: 4.1 MMOL/L (ref 3.5–5.1)
PROT SERPL-MCNC: 6.8 G/DL (ref 6.3–8.2)
RBC # BLD AUTO: 4.54 M/UL (ref 4.05–5.2)
SODIUM SERPL-SCNC: 141 MMOL/L (ref 136–145)
WBC # BLD AUTO: 5.4 K/UL (ref 4.3–11.1)

## 2025-08-26 ENCOUNTER — OFFICE VISIT (OUTPATIENT)
Dept: RHEUMATOLOGY | Age: 45
End: 2025-08-26
Payer: COMMERCIAL

## 2025-08-26 VITALS
WEIGHT: 215 LBS | DIASTOLIC BLOOD PRESSURE: 75 MMHG | SYSTOLIC BLOOD PRESSURE: 112 MMHG | HEIGHT: 60 IN | BODY MASS INDEX: 42.21 KG/M2 | HEART RATE: 74 BPM

## 2025-08-26 DIAGNOSIS — B37.31 VAGINAL CANDIDIASIS: ICD-10-CM

## 2025-08-26 DIAGNOSIS — R53.83 OTHER FATIGUE: ICD-10-CM

## 2025-08-26 DIAGNOSIS — M06.4 INFLAMMATORY POLYARTHRITIS (HCC): Primary | ICD-10-CM

## 2025-08-26 DIAGNOSIS — M25.40 JOINT SWELLING: ICD-10-CM

## 2025-08-26 DIAGNOSIS — Z79.60 LONG TERM CURRENT USE OF IMMUNOSUPPRESSIVE DRUG: ICD-10-CM

## 2025-08-26 DIAGNOSIS — Z79.899 HIGH RISK MEDICATION USE: ICD-10-CM

## 2025-08-26 DIAGNOSIS — I77.6 VASCULITIS: ICD-10-CM

## 2025-08-26 DIAGNOSIS — R89.4 SEROLOGIC ABNORMALITY: ICD-10-CM

## 2025-08-26 DIAGNOSIS — R79.82 CRP ELEVATED: ICD-10-CM

## 2025-08-26 DIAGNOSIS — Z79.52 LONG TERM (CURRENT) USE OF SYSTEMIC STEROIDS: ICD-10-CM

## 2025-08-26 DIAGNOSIS — M79.10 MYALGIA: ICD-10-CM

## 2025-08-26 DIAGNOSIS — G62.9 NEUROPATHY: ICD-10-CM

## 2025-08-26 DIAGNOSIS — E03.8 OTHER SPECIFIED HYPOTHYROIDISM: ICD-10-CM

## 2025-08-26 DIAGNOSIS — I82.409 ACUTE DEEP VEIN THROMBOSIS (DVT) OF OTHER VEIN OF LOWER EXTREMITY, UNSPECIFIED LATERALITY (HCC): ICD-10-CM

## 2025-08-26 DIAGNOSIS — E55.9 HYPOVITAMINOSIS D: ICD-10-CM

## 2025-08-26 DIAGNOSIS — Z71.85 VACCINE COUNSELING: ICD-10-CM

## 2025-08-26 DIAGNOSIS — M06.4 INFLAMMATORY POLYARTHRITIS (HCC): ICD-10-CM

## 2025-08-26 DIAGNOSIS — R70.0 ESR RAISED: ICD-10-CM

## 2025-08-26 LAB
CK SERPL-CCNC: 85 U/L (ref 21–215)
CRP SERPL-MCNC: 0.6 MG/DL (ref 0–0.4)
TSH, 3RD GENERATION: 1.79 UIU/ML (ref 0.27–4.2)

## 2025-08-26 PROCEDURE — 99215 OFFICE O/P EST HI 40 MIN: CPT | Performed by: INTERNAL MEDICINE

## 2025-08-26 RX ORDER — ERGOCALCIFEROL 1.25 MG/1
50000 CAPSULE, LIQUID FILLED ORAL WEEKLY
Qty: 12 CAPSULE | Refills: 0 | Status: SHIPPED | OUTPATIENT
Start: 2025-08-26

## 2025-08-26 RX ORDER — METHOTREXATE 25 MG/ML
INJECTION, SOLUTION INTRAMUSCULAR; INTRATHECAL; INTRAVENOUS; SUBCUTANEOUS
Qty: 12 ML | Refills: 0 | Status: SHIPPED | OUTPATIENT
Start: 2025-08-26

## 2025-08-26 RX ORDER — GABAPENTIN 300 MG/1
CAPSULE ORAL
Qty: 180 CAPSULE | Refills: 0 | Status: SHIPPED | OUTPATIENT
Start: 2025-08-26 | End: 2026-08-30

## 2025-08-26 RX ORDER — INFLUENZA A VIRUS A/VICTORIA/4897/2022 IVR-238 (H1N1) ANTIGEN (FORMALDEHYDE INACTIVATED), INFLUENZA A VIRUS A/CALIFORNIA/122/2022 SAN-022 (H3N2) ANTIGEN (FORMALDEHYDE INACTIVATED), AND INFLUENZA B VIRUS B/MICHIGAN/01/2021 ANTIGEN (FORMALDEHYDE INACTIVATED) 60; 60; 60 UG/.5ML; UG/.5ML; UG/.5ML
0.5 INJECTION, SUSPENSION INTRAMUSCULAR ONCE
Qty: 0.5 ML | Refills: 0 | Status: SHIPPED | OUTPATIENT
Start: 2025-08-26 | End: 2025-08-26

## 2025-08-26 RX ORDER — FOLIC ACID 1 MG/1
2 TABLET ORAL DAILY
Qty: 180 TABLET | Refills: 0 | Status: SHIPPED | OUTPATIENT
Start: 2025-08-26

## 2025-08-26 RX ORDER — PREDNISONE 5 MG/1
TABLET ORAL
Qty: 100 TABLET | Refills: 0 | Status: SHIPPED | OUTPATIENT
Start: 2025-08-26

## 2025-08-27 LAB
C-ANCA TITR SER IF: NORMAL TITER
C3 SERPL-MCNC: 146 MG/DL (ref 82–167)
C4 SERPL-MCNC: 29 MG/DL (ref 12–38)
CCP IGA+IGG SERPL IA-ACNC: 4 UNITS (ref 0–19)
CENTROMERE B AB SER-ACNC: <0.2 AI (ref 0–0.9)
CHROMATIN AB SERPL-ACNC: <0.2 AI (ref 0–0.9)
DSDNA AB SER-ACNC: <1 IU/ML (ref 0–9)
ENA JO1 AB SER-ACNC: <0.2 AI (ref 0–0.9)
ENA RNP AB SER-ACNC: <0.2 AI (ref 0–0.9)
ENA SCL70 AB SER-ACNC: 0.2 AI (ref 0–0.9)
ENA SM AB SER-ACNC: <0.2 AI (ref 0–0.9)
ENA SS-A AB SER-ACNC: <0.2 AI (ref 0–0.9)
ENA SS-B AB SER-ACNC: <0.2 AI (ref 0–0.9)
Lab: NORMAL
MITOCHONDRIA M2 IGG SER-ACNC: <20 UNITS (ref 0–20)
MYELOPEROXIDASE AB SER IA-ACNC: <0.2 UNITS (ref 0–0.9)
P-ANCA ATYPICAL TITR SER IF: NORMAL TITER
P-ANCA TITR SER IF: NORMAL TITER
PROTEINASE3 AB SER IA-ACNC: <0.2 UNITS (ref 0–0.9)
RHEUMATOID FACT SER QL LA: NEGATIVE
SMA IGG SER-ACNC: 8 UNITS (ref 0–19)

## 2025-08-30 LAB
ENA JO1 AB SER QL: <20 UNITS
ENA SSA 52KD AB: <20 UNITS
MDA5 ANTIBODY: <20 UNITS
NXP-2 ANTIBODY: <20 UNITS
PM-SCL ANTIBODY: <20 UNITS
TIF1-GAMMA ANTIBODY: <20 UNITS
U-1 SN RNP ANTIBODIES: <20 UNITS